# Patient Record
Sex: MALE | Race: BLACK OR AFRICAN AMERICAN | NOT HISPANIC OR LATINO | URBAN - METROPOLITAN AREA
[De-identification: names, ages, dates, MRNs, and addresses within clinical notes are randomized per-mention and may not be internally consistent; named-entity substitution may affect disease eponyms.]

---

## 2019-07-25 ENCOUNTER — INPATIENT (INPATIENT)
Facility: HOSPITAL | Age: 84
LOS: 5 days | Discharge: HOME | End: 2019-07-31
Attending: INTERNAL MEDICINE | Admitting: INTERNAL MEDICINE
Payer: MEDICARE

## 2019-07-25 VITALS
TEMPERATURE: 98 F | HEART RATE: 81 BPM | OXYGEN SATURATION: 99 % | DIASTOLIC BLOOD PRESSURE: 85 MMHG | SYSTOLIC BLOOD PRESSURE: 145 MMHG | RESPIRATION RATE: 16 BRPM

## 2019-07-25 DIAGNOSIS — Z91.89 OTHER SPECIFIED PERSONAL RISK FACTORS, NOT ELSEWHERE CLASSIFIED: ICD-10-CM

## 2019-07-25 DIAGNOSIS — Z29.9 ENCOUNTER FOR PROPHYLACTIC MEASURES, UNSPECIFIED: ICD-10-CM

## 2019-07-25 DIAGNOSIS — D64.9 ANEMIA, UNSPECIFIED: ICD-10-CM

## 2019-07-25 DIAGNOSIS — E83.52 HYPERCALCEMIA: ICD-10-CM

## 2019-07-25 DIAGNOSIS — I48.91 UNSPECIFIED ATRIAL FIBRILLATION: ICD-10-CM

## 2019-07-25 DIAGNOSIS — N40.0 BENIGN PROSTATIC HYPERPLASIA WITHOUT LOWER URINARY TRACT SYMPTOMS: ICD-10-CM

## 2019-07-25 DIAGNOSIS — I25.10 ATHEROSCLEROTIC HEART DISEASE OF NATIVE CORONARY ARTERY WITHOUT ANGINA PECTORIS: ICD-10-CM

## 2019-07-25 DIAGNOSIS — N18.3 CHRONIC KIDNEY DISEASE, STAGE 3 (MODERATE): ICD-10-CM

## 2019-07-25 DIAGNOSIS — T18.3XXA FOREIGN BODY IN SMALL INTESTINE, INITIAL ENCOUNTER: ICD-10-CM

## 2019-07-25 DIAGNOSIS — G30.9 ALZHEIMER'S DISEASE, UNSPECIFIED: ICD-10-CM

## 2019-07-25 DIAGNOSIS — F03.90 UNSPECIFIED DEMENTIA WITHOUT BEHAVIORAL DISTURBANCE: ICD-10-CM

## 2019-07-25 LAB
ALBUMIN SERPL ELPH-MCNC: 4.5 G/DL — SIGNIFICANT CHANGE UP (ref 3.3–5)
ALP SERPL-CCNC: 63 U/L — SIGNIFICANT CHANGE UP (ref 40–120)
ALT FLD-CCNC: 11 U/L — SIGNIFICANT CHANGE UP (ref 4–41)
ANION GAP SERPL CALC-SCNC: 13 MMO/L — SIGNIFICANT CHANGE UP (ref 7–14)
APPEARANCE UR: CLEAR — SIGNIFICANT CHANGE UP
AST SERPL-CCNC: 20 U/L — SIGNIFICANT CHANGE UP (ref 4–40)
BACTERIA # UR AUTO: NEGATIVE — SIGNIFICANT CHANGE UP
BASOPHILS # BLD AUTO: 0.05 K/UL — SIGNIFICANT CHANGE UP (ref 0–0.2)
BASOPHILS NFR BLD AUTO: 0.8 % — SIGNIFICANT CHANGE UP (ref 0–2)
BILIRUB SERPL-MCNC: 0.9 MG/DL — SIGNIFICANT CHANGE UP (ref 0.2–1.2)
BILIRUB UR-MCNC: NEGATIVE — SIGNIFICANT CHANGE UP
BLOOD UR QL VISUAL: NEGATIVE — SIGNIFICANT CHANGE UP
BUN SERPL-MCNC: 19 MG/DL — SIGNIFICANT CHANGE UP (ref 7–23)
CALCIUM SERPL-MCNC: 10.8 MG/DL — HIGH (ref 8.4–10.5)
CHLORIDE SERPL-SCNC: 104 MMOL/L — SIGNIFICANT CHANGE UP (ref 98–107)
CO2 SERPL-SCNC: 26 MMOL/L — SIGNIFICANT CHANGE UP (ref 22–31)
COLOR SPEC: YELLOW — SIGNIFICANT CHANGE UP
CREAT SERPL-MCNC: 1.13 MG/DL — SIGNIFICANT CHANGE UP (ref 0.5–1.3)
EOSINOPHIL # BLD AUTO: 0.07 K/UL — SIGNIFICANT CHANGE UP (ref 0–0.5)
EOSINOPHIL NFR BLD AUTO: 1.1 % — SIGNIFICANT CHANGE UP (ref 0–6)
GLUCOSE SERPL-MCNC: 109 MG/DL — HIGH (ref 70–99)
GLUCOSE UR-MCNC: NEGATIVE — SIGNIFICANT CHANGE UP
HCT VFR BLD CALC: 38 % — LOW (ref 39–50)
HGB BLD-MCNC: 11.4 G/DL — LOW (ref 13–17)
HYALINE CASTS # UR AUTO: NEGATIVE — SIGNIFICANT CHANGE UP
IMM GRANULOCYTES NFR BLD AUTO: 0.3 % — SIGNIFICANT CHANGE UP (ref 0–1.5)
KETONES UR-MCNC: NEGATIVE — SIGNIFICANT CHANGE UP
LEUKOCYTE ESTERASE UR-ACNC: NEGATIVE — SIGNIFICANT CHANGE UP
LYMPHOCYTES # BLD AUTO: 0.95 K/UL — LOW (ref 1–3.3)
LYMPHOCYTES # BLD AUTO: 15.6 % — SIGNIFICANT CHANGE UP (ref 13–44)
MCHC RBC-ENTMCNC: 27.9 PG — SIGNIFICANT CHANGE UP (ref 27–34)
MCHC RBC-ENTMCNC: 30 % — LOW (ref 32–36)
MCV RBC AUTO: 93.1 FL — SIGNIFICANT CHANGE UP (ref 80–100)
MONOCYTES # BLD AUTO: 0.49 K/UL — SIGNIFICANT CHANGE UP (ref 0–0.9)
MONOCYTES NFR BLD AUTO: 8 % — SIGNIFICANT CHANGE UP (ref 2–14)
NEUTROPHILS # BLD AUTO: 4.52 K/UL — SIGNIFICANT CHANGE UP (ref 1.8–7.4)
NEUTROPHILS NFR BLD AUTO: 74.2 % — SIGNIFICANT CHANGE UP (ref 43–77)
NITRITE UR-MCNC: NEGATIVE — SIGNIFICANT CHANGE UP
NRBC # FLD: 0 K/UL — SIGNIFICANT CHANGE UP (ref 0–0)
PH UR: 6 — SIGNIFICANT CHANGE UP (ref 5–8)
PLATELET # BLD AUTO: 189 K/UL — SIGNIFICANT CHANGE UP (ref 150–400)
PMV BLD: 10.5 FL — SIGNIFICANT CHANGE UP (ref 7–13)
POTASSIUM SERPL-MCNC: 3.6 MMOL/L — SIGNIFICANT CHANGE UP (ref 3.5–5.3)
POTASSIUM SERPL-SCNC: 3.6 MMOL/L — SIGNIFICANT CHANGE UP (ref 3.5–5.3)
PROT SERPL-MCNC: 7.8 G/DL — SIGNIFICANT CHANGE UP (ref 6–8.3)
PROT UR-MCNC: 30 — SIGNIFICANT CHANGE UP
RBC # BLD: 4.08 M/UL — LOW (ref 4.2–5.8)
RBC # FLD: 13.9 % — SIGNIFICANT CHANGE UP (ref 10.3–14.5)
RBC CASTS # UR COMP ASSIST: SIGNIFICANT CHANGE UP (ref 0–?)
SODIUM SERPL-SCNC: 143 MMOL/L — SIGNIFICANT CHANGE UP (ref 135–145)
SP GR SPEC: 1.03 — SIGNIFICANT CHANGE UP (ref 1–1.04)
SQUAMOUS # UR AUTO: SIGNIFICANT CHANGE UP
UROBILINOGEN FLD QL: HIGH
WBC # BLD: 6.1 K/UL — SIGNIFICANT CHANGE UP (ref 3.8–10.5)
WBC # FLD AUTO: 6.1 K/UL — SIGNIFICANT CHANGE UP (ref 3.8–10.5)
WBC UR QL: SIGNIFICANT CHANGE UP (ref 0–?)

## 2019-07-25 PROCEDURE — 99223 1ST HOSP IP/OBS HIGH 75: CPT

## 2019-07-25 PROCEDURE — 71046 X-RAY EXAM CHEST 2 VIEWS: CPT | Mod: 26

## 2019-07-25 PROCEDURE — 93010 ELECTROCARDIOGRAM REPORT: CPT

## 2019-07-25 RX ORDER — SODIUM CHLORIDE 9 MG/ML
1000 INJECTION INTRAMUSCULAR; INTRAVENOUS; SUBCUTANEOUS
Refills: 0 | Status: DISCONTINUED | OUTPATIENT
Start: 2019-07-25 | End: 2019-07-31

## 2019-07-25 RX ORDER — DONEPEZIL HYDROCHLORIDE 10 MG/1
5 TABLET, FILM COATED ORAL AT BEDTIME
Refills: 0 | Status: DISCONTINUED | OUTPATIENT
Start: 2019-07-25 | End: 2019-07-31

## 2019-07-25 RX ORDER — HALOPERIDOL DECANOATE 100 MG/ML
0.5 INJECTION INTRAMUSCULAR EVERY 6 HOURS
Refills: 0 | Status: DISCONTINUED | OUTPATIENT
Start: 2019-07-25 | End: 2019-07-31

## 2019-07-25 RX ORDER — ATORVASTATIN CALCIUM 80 MG/1
10 TABLET, FILM COATED ORAL AT BEDTIME
Refills: 0 | Status: DISCONTINUED | OUTPATIENT
Start: 2019-07-25 | End: 2019-07-31

## 2019-07-25 RX ORDER — DILTIAZEM HCL 120 MG
120 CAPSULE, EXT RELEASE 24 HR ORAL DAILY
Refills: 0 | Status: DISCONTINUED | OUTPATIENT
Start: 2019-07-26 | End: 2019-07-31

## 2019-07-25 RX ORDER — THIAMINE MONONITRATE (VIT B1) 100 MG
100 TABLET ORAL DAILY
Refills: 0 | Status: DISCONTINUED | OUTPATIENT
Start: 2019-07-25 | End: 2019-07-31

## 2019-07-25 RX ORDER — TAMSULOSIN HYDROCHLORIDE 0.4 MG/1
0.4 CAPSULE ORAL AT BEDTIME
Refills: 0 | Status: DISCONTINUED | OUTPATIENT
Start: 2019-07-25 | End: 2019-07-31

## 2019-07-25 RX ORDER — CLOPIDOGREL BISULFATE 75 MG/1
75 TABLET, FILM COATED ORAL DAILY
Refills: 0 | Status: DISCONTINUED | OUTPATIENT
Start: 2019-07-26 | End: 2019-07-31

## 2019-07-25 RX ADMIN — SODIUM CHLORIDE 75 MILLILITER(S): 9 INJECTION INTRAMUSCULAR; INTRAVENOUS; SUBCUTANEOUS at 16:05

## 2019-07-25 NOTE — H&P ADULT - PROBLEM SELECTOR PLAN 1
-worsening dementia with behavioral disturbance, no identifiable infectious or metabolic cause  -QTc 450, treat agitation with IV haldol .5 mg q6 prn, behavioral health c/s in am   -SW and CM working with daughter to find placement -worsening dementia with behavioral disturbance, no identifiable infectious or metabolic cause  -QTc 450, treat agitation with IV haldol .5 mg q6 prn, behavioral health c/s in am   -SW and CM working with daughter to find placement  -c/w donepezil

## 2019-07-25 NOTE — DISCHARGE NOTE PROVIDER - CARE PROVIDERS DIRECT ADDRESSES
,[email or web address]
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
## 2019-07-25 NOTE — H&P ADULT - PROBLEM SELECTOR PLAN 3
- rate controlled  - pending med rec and restart home medication - rate controlled  - c/w cardizem  -given age, advanced dementia, and potential fall risk (uses cane to ambulate), risks of starting a/c for afib likely outweigh benefits

## 2019-07-25 NOTE — ED PROVIDER NOTE - PHYSICAL EXAMINATION
General - alert, no distress, smells of urine  HEENT - normocephalic, atraumatic, moist mucous membranes  CV - S1, S2, no murmur  Pulm - clear to ascultation, no wheeze, rales, ronchi  Abdomen - soft, nontender, nondistended  Extremities - no edema, nontender  Neuro - alert, oriented x1  Psych - word-finding difficulty, affect appropriate
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
## 2019-07-25 NOTE — DISCHARGE NOTE PROVIDER - NSDCCPCAREPLAN_GEN_ALL_CORE_FT
PRINCIPAL DISCHARGE DIAGNOSIS  Diagnosis: Alzheimer's dementia with behavioral disturbance, unspecified timing of dementia onset  Assessment and Plan of Treatment: You were seen by psychiatry who recommeded---      SECONDARY DISCHARGE DIAGNOSES  Diagnosis: Foreign body in intestine, initial encounter  Assessment and Plan of Treatment: ---    Diagnosis: Anemia, unspecified type  Assessment and Plan of Treatment: Stable. Continue to monitor outpatinet with PCP.    Diagnosis: Atrial fibrillation, unspecified type  Assessment and Plan of Treatment: Continue home medications.    Diagnosis: Hypercalcemia  Assessment and Plan of Treatment: Continue to monitor outpatinet with PCP.    Diagnosis: CKD (chronic kidney disease) stage 3, GFR 30-59 ml/min  Assessment and Plan of Treatment: Continue care with PCP outpatinet. PRINCIPAL DISCHARGE DIAGNOSIS  Diagnosis: Alzheimer's dementia with behavioral disturbance, unspecified timing of dementia onset  Assessment and Plan of Treatment: You were seen by psychiatry who recommeded---      SECONDARY DISCHARGE DIAGNOSES  Diagnosis: CKD (chronic kidney disease) stage 3, GFR 30-59 ml/min  Assessment and Plan of Treatment: Continue care with PCP outpatinet.    Diagnosis: Anemia, unspecified type  Assessment and Plan of Treatment: Stable. Continue to monitor outpatinet with PCP.    Diagnosis: Hypercalcemia  Assessment and Plan of Treatment: Continue to monitor outpatinet with PCP.    Diagnosis: Foreign body in intestine, initial encounter  Assessment and Plan of Treatment: CT of the abdomen _______________    Diagnosis: Atrial fibrillation, unspecified type  Assessment and Plan of Treatment: Continue home medications. PRINCIPAL DISCHARGE DIAGNOSIS  Diagnosis: Alzheimer's dementia with behavioral disturbance, unspecified timing of dementia onset  Assessment and Plan of Treatment: Please continue your medications as prescribed and allow help with daily acitivities of living. Maintain a safe environment and make movements in a careful manner to prevent falls. Ensure that you are eating and drinking adequately and maintaining a healthy sleep cycle. If you are in need of assistance with medication adjustment you can follow-up with your outpatient provider or refer to the [facility] Geriatric Psychiatry clinic by calling [phone].      SECONDARY DISCHARGE DIAGNOSES  Diagnosis: Inguinal hernia  Assessment and Plan of Treatment: You were found to have an inguinal hernia upon CT scan of the abdomen for which surgery was consulted and recommends outpatient follow-up for elective surgery.    Diagnosis: CKD (chronic kidney disease) stage 3, GFR 30-59 ml/min  Assessment and Plan of Treatment: In order to prevent further disease progression, continue to follow recommendations made by your primary provider/nephrologist. Continue a diet that is low in sodium and avoid foods that are concentrated in potassium and phosphorus. Continue your medications/supplementations as directed and avoid over-the-counter drugs that are harmful to kidneys, such as, Non-Steroidal Anti-Inflammatory Drugs (NSAIDs). Follow-up as outpatient to monitor your kidney function, as well as, vitamin D, Calcium, potassium, and phosphorus levels.    Diagnosis: Anemia, unspecified type  Assessment and Plan of Treatment: Follow-up with your outpatient provider for further monitoring of your blood counts. Monitor for signs/symptoms indicating worsening of disease, such as, easy bleeding/bruising, pale skin, fatigue, dizziness, increased heart rate, or chest pain.    Diagnosis: Benign prostatic hyperplasia, unspecified whether lower urinary tract symptoms present  Assessment and Plan of Treatment: Continue Flomax.    Diagnosis: Coronary artery disease, angina presence unspecified, unspecified vessel or lesion type, unspecified whether native or transplanted heart  Assessment and Plan of Treatment: PLAVIX ___________    Diagnosis: Atrial fibrillation, unspecified type  Assessment and Plan of Treatment: Please continue your Cardizem as directed and follow-up with your primary provider/cardiologist to further manage your care. Monitor for signs/symptoms of uncontrolled atrial fibrillation, such as, increased heart rate, palpitations, chest pain, dizziness, or shortness of breath - Return to emergency room if these signs/symptoms are present. PRINCIPAL DISCHARGE DIAGNOSIS  Diagnosis: Alzheimer's dementia with behavioral disturbance, unspecified timing of dementia onset  Assessment and Plan of Treatment: Please continue your medications as prescribed and allow help with daily acitivities of living. Maintain a safe environment and make movements in a careful manner to prevent falls. Ensure that you are eating and drinking adequately and maintaining a healthy sleep cycle. If you are in need of assistance with medication adjustment you can follow-up with your outpatient provider or refer to the [facility] Geriatric Psychiatry clinic by calling [phone].      SECONDARY DISCHARGE DIAGNOSES  Diagnosis: Inguinal hernia  Assessment and Plan of Treatment: You were found to have an inguinal hernia upon CT scan of the abdomen for which surgery was consulted and recommends outpatient follow-up for elective surgery.    Diagnosis: CKD (chronic kidney disease) stage 3, GFR 30-59 ml/min  Assessment and Plan of Treatment: In order to prevent further disease progression, continue to follow recommendations made by your primary provider/nephrologist. Continue a diet that is low in sodium and avoid foods that are concentrated in potassium and phosphorus. Continue your medications/supplementations as directed and avoid over-the-counter drugs that are harmful to kidneys, such as, Non-Steroidal Anti-Inflammatory Drugs (NSAIDs). Follow-up as outpatient to monitor your kidney function, as well as, vitamin D, Calcium, potassium, and phosphorus levels.    Diagnosis: Anemia, unspecified type  Assessment and Plan of Treatment: Follow-up with your outpatient provider for further monitoring of your blood counts. Monitor for signs/symptoms indicating worsening of disease, such as, easy bleeding/bruising, pale skin, fatigue, dizziness, increased heart rate, or chest pain.    Diagnosis: Benign prostatic hyperplasia, unspecified whether lower urinary tract symptoms present  Assessment and Plan of Treatment: Continue Flomax.    Diagnosis: Coronary artery disease, angina presence unspecified, unspecified vessel or lesion type, unspecified whether native or transplanted heart  Assessment and Plan of Treatment: Continue Aspirin and Plavix and follow-up with cardiologist as outpatient.    Diagnosis: Atrial fibrillation, unspecified type  Assessment and Plan of Treatment: Please continue your Cardizem as directed and follow-up with your primary provider/cardiologist to further manage your care. Monitor for signs/symptoms of uncontrolled atrial fibrillation, such as, increased heart rate, palpitations, chest pain, dizziness, or shortness of breath - Return to emergency room if these signs/symptoms are present. PRINCIPAL DISCHARGE DIAGNOSIS  Diagnosis: Alzheimer's dementia with behavioral disturbance, unspecified timing of dementia onset  Assessment and Plan of Treatment: Please continue your medications as prescribed and allow help with daily acitivities of living. Maintain a safe environment and make movements in a careful manner to prevent falls. Ensure that you are eating and drinking adequately and maintaining a healthy sleep cycle. If you are in need of assistance with medication adjustment you can follow-up with your outpatient provider or refer to the [facility] Geriatric Psychiatry clinic by calling [phone].      SECONDARY DISCHARGE DIAGNOSES  Diagnosis: Anemia, unspecified type  Assessment and Plan of Treatment: Follow-up with your outpatient provider for further monitoring of your blood counts. Monitor for signs/symptoms indicating worsening of disease, such as, easy bleeding/bruising, pale skin, fatigue, dizziness, increased heart rate, or chest pain.    Diagnosis: Atrial fibrillation, unspecified type  Assessment and Plan of Treatment: Please continue your Cardizem as directed and follow-up with your primary provider/cardiologist to further manage your care. Monitor for signs/symptoms of uncontrolled atrial fibrillation, such as, increased heart rate, palpitations, chest pain, dizziness, or shortness of breath - Return to emergency room if these signs/symptoms are present.    Diagnosis: CKD (chronic kidney disease) stage 3, GFR 30-59 ml/min  Assessment and Plan of Treatment: In order to prevent further disease progression, continue to follow recommendations made by your primary provider/nephrologist. Continue a diet that is low in sodium and avoid foods that are concentrated in potassium and phosphorus. Continue your medications/supplementations as directed and avoid over-the-counter drugs that are harmful to kidneys, such as, Non-Steroidal Anti-Inflammatory Drugs (NSAIDs). Follow-up as outpatient to monitor your kidney function, as well as, vitamin D, Calcium, potassium, and phosphorus levels.    Diagnosis: Inguinal hernia  Assessment and Plan of Treatment: You were found to have an inguinal hernia upon CT scan of the abdomen for which surgery was consulted and recommends outpatient follow-up for elective surgery.    Diagnosis: Coronary artery disease, angina presence unspecified, unspecified vessel or lesion type, unspecified whether native or transplanted heart  Assessment and Plan of Treatment: Continue Aspirin and Plavix and follow-up with cardiologist as outpatient.    Diagnosis: Benign prostatic hyperplasia, unspecified whether lower urinary tract symptoms present  Assessment and Plan of Treatment: Continue Flomax.

## 2019-07-25 NOTE — ED ADULT NURSE NOTE - NSIMPLEMENTINTERV_GEN_ALL_ED
Implemented All Universal Safety Interventions:  [place] to call system. Call bell, personal items and telephone within reach. Instruct patient to call for assistance. Room bathroom lighting operational. Non-slip footwear when patient is off stretcher. Physically safe environment: no spills, clutter or unnecessary equipment. Stretcher in lowest position, wheels locked, appropriate side rails in place.

## 2019-07-25 NOTE — H&P ADULT - PROBLEM SELECTOR PROBLEM 5
Anemia, unspecified type Coronary artery disease, angina presence unspecified, unspecified vessel or lesion type, unspecified whether native or transplanted heart
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
## 2019-07-25 NOTE — H&P ADULT - NSHPPHYSICALEXAM_GEN_ALL_CORE
Vital Signs Last 24 Hrs  T(C): 36.6 (07-25-19 @ 15:56), Max: 36.7 (07-25-19 @ 09:14)  T(F): 97.8 (07-25-19 @ 15:56), Max: 98.1 (07-25-19 @ 09:14)  HR: 92 (07-25-19 @ 15:56) (81 - 92)  BP: 154/90 (07-25-19 @ 15:56) (145/85 - 154/90)  BP(mean): --  RR: 16 (07-25-19 @ 15:56) (16 - 17)  SpO2: 99% (07-25-19 @ 15:56) (99% - 99%)    GENERAL: NAD  HEENT: EOMI, MMM, no oropharyngeal lesions or erythema appreciated  Pulm: normal work of breathing, CTABL  CV: irregularly irregular, S1&S2+, no m/r/g appreciated  ABDOMEN: soft, nt, nd,   MSK: nl ROM  EXTREMITIES:  no appreciable edema in b/l LE  Neuro: A&Ox1 (knows name), no focal deficits  SKIN: warm and dry, no visible rash

## 2019-07-25 NOTE — ED PROVIDER NOTE - NS ED ATTENDING STATEMENT MOD
I have personally seen and examined this patient.  I have fully participated in the care of this patient. I have reviewed all pertinent clinical information, including history, physical exam, plan and the Medical Student and Resident’s note and agree except as noted.

## 2019-07-25 NOTE — DISCHARGE NOTE PROVIDER - PROVIDER TOKENS
PROVIDER:[TOKEN:[[id number]]] PROVIDER:[TOKEN:[[id number]]],PROVIDER:[TOKEN:[[id number]]]

## 2019-07-25 NOTE — H&P ADULT - ASSESSMENT
87M h/o dementia, HTN, afib, brought in by EMS after leaving home, and daughter reports she can no longer take care of him at home, a/f behavioral disturbance and assistance with placement: 87M h/o dementia, HTN, afib, BPH, brought in by EMS after leaving home, and daughter reports she can no longer take care of him at home, a/f behavioral disturbance and assistance with placement:

## 2019-07-25 NOTE — H&P ADULT - PROBLEM SELECTOR PLAN 10
patient has Ramsey Carrasquillo listed as PMD but no notes in all scripts. clarify PMD in am with daughter.
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
## 2019-07-25 NOTE — DISCHARGE NOTE PROVIDER - HOSPITAL COURSE
87M h/o dementia, HTN, afib, brought in by EMS after leaving home, and daughter reports she can no longer take care of him at home, a/f behavioral disturbance and assistance with placement: 87M h/o dementia, HTN, afib, brought in by EMS after leaving home, and daughter reports she can no longer take care of him at home, a/f behavioral disturbance and assistance with placement        Alzheimer's dementia     - worsening dementia with behavioral disturbance, no identifiable infectious or metabolic cause    - Psych consulted     - SW and CM working with daughter to find placement.         Foreign body in intestine, initial encounter.      Abd xray: Small linear metallic density in the right upper quadrant. This may represent surgical clips versus ingested foreign body    Ct of abdomen ordered, f/u __________________        Atrial fibrillation, unspecified type.      - rate controlled        Hypercalcemia.       - s/p IVF, improved         Anemia    - stable from 2016, continue to monitor.         CKD    - renally dose meds, outpt f/u.        Dispo ________________ 87M h/o dementia, HTN, afib, brought in by EMS after leaving home, and daughter reports she can no longer take care of him at home, a/f behavioral disturbance and assistance with placement        Alzheimer's dementia: Patient with worsening dementia with behavioral disturbance, no identifiable infectious or metabolic cause; Psychiatry consulted and PRNs ordered; SW/CM consulted to work with patient's daughter for placement versus taking patient home with help        Foreign body: CT abdomen confirms that X-ray imaging in fact shows cholecystectomy surgical clips rather than a foreign body; Also noted to find L inguinal hernia for which surgery was consulted and recommends outpatient follow-up as there is no obstruction/strangulation        Atrial fibrillation: Cardiology consulted; Rate controlled with continued Cardizem        CAD: Continued with statin; Cardiology consulted and recommended in setting of fall risk that AC be discontinued; PLAVIX _____________        Hypercalcemia: Given IVF and levels returned to normal         Dispo: 87M h/o dementia, HTN, afib, brought in by EMS after leaving home, and daughter reports she can no longer take care of him at home, a/f behavioral disturbance and assistance with placement        Alzheimer's dementia: Patient with worsening dementia with behavioral disturbance, no identifiable infectious or metabolic cause; Psychiatry consulted and PRNs ordered; SW/CM consulted to work with patient's daughter for placement versus taking patient home with help        Foreign body: CT abdomen confirms that X-ray imaging in fact shows cholecystectomy surgical clips rather than a foreign body; Also noted to find L inguinal hernia for which surgery was consulted and recommends outpatient follow-up as there is no obstruction/strangulation        Atrial fibrillation: Cardiology consulted; Rate controlled with continued Cardizem; No AC recommended in setting of fall risk         CAD: Continued with statin and Plavix; Cardiology consulted        Hypercalcemia: Given IVF and levels returned to normal         Dispo: 87M h/o dementia, HTN, afib, brought in by EMS after leaving home, and daughter reports she can no longer take care of him at home, a/f behavioral disturbance and assistance with placement        Alzheimer's dementia: Patient with worsening dementia with behavioral disturbance, no identifiable infectious or metabolic cause; Psychiatry consulted and PRNs ordered; SW/CM consulted to work with patient's daughter for placement versus taking patient home with help        Foreign body: CT abdomen confirms that X-ray imaging in fact shows cholecystectomy surgical clips rather than a foreign body; Also noted to find L inguinal hernia for which surgery was consulted and recommends outpatient follow-up as there is no obstruction/strangulation        Atrial fibrillation: Cardiology consulted; Rate controlled with continued Cardizem; No AC recommended in setting of fall risk         CAD: Continued with statin and Plavix; Cardiology consulted        Hypercalcemia: Given IVF and levels returned to normal         Dispo:  Home 87M h/o dementia, HTN, afib, brought in by EMS after leaving home, and daughter reports she can no longer take care of him at home, a/f behavioral disturbance and assistance with placement        Alzheimer's dementia: Patient with worsening dementia with behavioral disturbance, no identifiable infectious or metabolic cause; Psychiatry consulted and PRNs ordered; SW/CM consulted to work with patient's daughter for placement versus taking patient home with help        Foreign body: CT abdomen confirms that X-ray imaging in fact shows cholecystectomy surgical clips rather than a foreign body; Also noted to find L inguinal hernia for which surgery was consulted and recommends outpatient follow-up as there is no obstruction/strangulation        Atrial fibrillation: Cardiology consulted; Rate controlled with continued Cardizem; No AC recommended in setting of fall risk         CAD: Continued with statin and Plavix; Cardiology consulted        Hypercalcemia: Given IVF and levels returned to normal         Dispo:  Home     As per daughter Melba [facility] and does not need refills at this time. No new medications started at this time.

## 2019-07-25 NOTE — H&P ADULT - NSICDXPASTMEDICALHX_GEN_ALL_CORE_FT
PAST MEDICAL HISTORY:  Atrial fibrillation     Hypertension, unspecified type     Other hyperlipidemia

## 2019-07-25 NOTE — ED ADULT TRIAGE NOTE - CHIEF COMPLAINT QUOTE
Pt BIBA because daughter can no longer care for him and wants him placed in SNF. Pt offers no complaints PMH dementia, HLD

## 2019-07-25 NOTE — DISCHARGE NOTE PROVIDER - CARE PROVIDER_API CALL
Ramsey Carrasquillo)  Internal Medicine  [address] [place] [address]  Phone: [phone]  Fax: [phone]  Follow Up Time: Ramsey Carrasquillo)  Internal Medicine  [id number] [place] [address]  Phone: [phone]  Fax: [phone]  Follow Up Time:     Tommy Jennings)  Surgery  [address], [address]  [place] [address]  Phone: [phone]  Fax: [phone]  Follow Up Time:

## 2019-07-25 NOTE — ED PROVIDER NOTE - CLINICAL SUMMARY MEDICAL DECISION MAKING FREE TEXT BOX
86 yo M w/ dementia HTN, HLD here for SNF placement  Social work following  unable to contact daughter  basic labs, UA/urine culture  will admit to hospitalist for placement

## 2019-07-26 DIAGNOSIS — F03.91 UNSPECIFIED DEMENTIA WITH BEHAVIORAL DISTURBANCE: ICD-10-CM

## 2019-07-26 LAB
ANION GAP SERPL CALC-SCNC: 11 MMO/L — SIGNIFICANT CHANGE UP (ref 7–14)
BUN SERPL-MCNC: 16 MG/DL — SIGNIFICANT CHANGE UP (ref 7–23)
CALCIUM SERPL-MCNC: 10 MG/DL — SIGNIFICANT CHANGE UP (ref 8.4–10.5)
CHLORIDE SERPL-SCNC: 107 MMOL/L — SIGNIFICANT CHANGE UP (ref 98–107)
CO2 SERPL-SCNC: 25 MMOL/L — SIGNIFICANT CHANGE UP (ref 22–31)
CREAT SERPL-MCNC: 1.06 MG/DL — SIGNIFICANT CHANGE UP (ref 0.5–1.3)
GLUCOSE SERPL-MCNC: 79 MG/DL — SIGNIFICANT CHANGE UP (ref 70–99)
MAGNESIUM SERPL-MCNC: 1.6 MG/DL — SIGNIFICANT CHANGE UP (ref 1.6–2.6)
PHOSPHATE SERPL-MCNC: 2.9 MG/DL — SIGNIFICANT CHANGE UP (ref 2.5–4.5)
POTASSIUM SERPL-MCNC: 3.7 MMOL/L — SIGNIFICANT CHANGE UP (ref 3.5–5.3)
POTASSIUM SERPL-SCNC: 3.7 MMOL/L — SIGNIFICANT CHANGE UP (ref 3.5–5.3)
SODIUM SERPL-SCNC: 143 MMOL/L — SIGNIFICANT CHANGE UP (ref 135–145)

## 2019-07-26 PROCEDURE — 99233 SBSQ HOSP IP/OBS HIGH 50: CPT

## 2019-07-26 PROCEDURE — 74018 RADEX ABDOMEN 1 VIEW: CPT | Mod: 26

## 2019-07-26 RX ADMIN — TAMSULOSIN HYDROCHLORIDE 0.4 MILLIGRAM(S): 0.4 CAPSULE ORAL at 21:09

## 2019-07-26 RX ADMIN — ATORVASTATIN CALCIUM 10 MILLIGRAM(S): 80 TABLET, FILM COATED ORAL at 21:09

## 2019-07-26 RX ADMIN — Medication 1 TABLET(S): at 12:30

## 2019-07-26 RX ADMIN — Medication 100 MILLIGRAM(S): at 12:30

## 2019-07-26 RX ADMIN — Medication 120 MILLIGRAM(S): at 05:21

## 2019-07-26 RX ADMIN — CLOPIDOGREL BISULFATE 75 MILLIGRAM(S): 75 TABLET, FILM COATED ORAL at 12:30

## 2019-07-26 RX ADMIN — DONEPEZIL HYDROCHLORIDE 5 MILLIGRAM(S): 10 TABLET, FILM COATED ORAL at 21:09
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
## 2019-07-26 NOTE — BEHAVIORAL HEALTH ASSESSMENT NOTE - NSBHCONSULTMEDAGITATION_PSY_A_CORE FT
Seroquel 12.5mg PO/IV q6h PRN agitation.  Monitor for qtc<500 Seroquel 12.5mg PO q6h PRN agitation.  Monitor for qtc<500

## 2019-07-26 NOTE — BEHAVIORAL HEALTH ASSESSMENT NOTE - RISK ASSESSMENT
Pt is currently at low baseline risk. Acute risk factors include cognitive impairment and impulsivity. Protective factors include no current or past SI, no h/o psychiatric disorder, no history of psychiatric hospitalizations, no family history of SAs, supportive family.
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
## 2019-07-26 NOTE — BEHAVIORAL HEALTH ASSESSMENT NOTE - PRIMARY DX
Alzheimer's dementia with behavioral disturbance, unspecified timing of dementia onset Dementia with behavioral disturbance, unspecified dementia type
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
## 2019-07-26 NOTE — PROGRESS NOTE ADULT - SUBJECTIVE AND OBJECTIVE BOX
INTERVAL HPI/OVERNIGHT EVENTS: I have upset stomach   Vital Signs Last 24 Hrs  T(C): 36.6 (2019 05:18), Max: 36.6 (2019 15:56)  T(F): 97.9 (2019 05:18), Max: 97.9 (2019 05:18)  HR: 90 (2019 05:18) (90 - 93)  BP: 158/85 (2019 05:18) (142/93 - 158/85)  BP(mean): --  RR: 18 (2019 05:18) (16 - 18)  SpO2: 99% (2019 05:18) (99% - 99%)  I&O's Summary    MEDICATIONS  (STANDING):  atorvastatin 10 milliGRAM(s) Oral at bedtime  clopidogrel Tablet 75 milliGRAM(s) Oral daily  diltiazem    milliGRAM(s) Oral daily  donepezil 5 milliGRAM(s) Oral at bedtime  multivitamin 1 Tablet(s) Oral daily  sodium chloride 0.9%. 1000 milliLiter(s) (75 mL/Hr) IV Continuous <Continuous>  tamsulosin 0.4 milliGRAM(s) Oral at bedtime  thiamine 100 milliGRAM(s) Oral daily    MEDICATIONS  (PRN):  haloperidol    Injectable 0.5 milliGRAM(s) IV Push every 6 hours PRN agitation    LABS:                        11.4   6.10  )-----------( 189      ( 2019 11:00 )             38.0     07-26    143  |  107  |  16  ----------------------------<  79  3.7   |  25  |  1.06    Ca    10.0      2019 06:06  Phos  2.9     07-  Mg     1.6     07-26    TPro  7.8  /  Alb  4.5  /  TBili  0.9  /  DBili  x   /  AST  20  /  ALT  11  /  AlkPhos  63  07-25      Urinalysis Basic - ( 2019 11:00 )    Color: YELLOW / Appearance: CLEAR / S.030 / pH: 6.0  Gluc: NEGATIVE / Ketone: NEGATIVE  / Bili: NEGATIVE / Urobili: SMALL   Blood: NEGATIVE / Protein: 30 / Nitrite: NEGATIVE   Leuk Esterase: NEGATIVE / RBC: 2-5 / WBC 0-2   Sq Epi: OCC / Non Sq Epi: x / Bacteria: NEGATIVE      CAPILLARY BLOOD GLUCOSE            Urinalysis Basic - ( 2019 11:00 )    Color: YELLOW / Appearance: CLEAR / S.030 / pH: 6.0  Gluc: NEGATIVE / Ketone: NEGATIVE  / Bili: NEGATIVE / Urobili: SMALL   Blood: NEGATIVE / Protein: 30 / Nitrite: NEGATIVE   Leuk Esterase: NEGATIVE / RBC: 2-5 / WBC 0-2   Sq Epi: OCC / Non Sq Epi: x / Bacteria: NEGATIVE      REVIEW OF SYSTEMS:  CONSTITUTIONAL: No fever, weight loss, or fatigue  EYES: No eye pain, visual disturbances, or discharge  ENMT:  No difficulty hearing, tinnitus, vertigo; No sinus or throat pain  NECK: No pain or stiffness  RESPIRATORY: No cough, wheezing, chills or hemoptysis; No shortness of breath  CARDIOVASCULAR: No chest pain, palpitations, dizziness, or leg swelling  GASTROINTESTINAL: upset stomach   GENITOURINARY: No dysuria, frequency, hematuria, or incontinence  NEUROLOGICAL: No headaches, memory loss, loss of strength, numbness, or tremors  Consultant(s) Notes Reviewed:  [x ] YES  [ ] NO    PHYSICAL EXAM:  GENERAL: NAD, well-groomed, well-developed ,not in any distress ,  HEAD:  Atraumatic, Normocephalic  EYES: EOMI, PERRLA, conjunctiva and sclera clear  ENMT: No tonsillar erythema, exudates, or enlargement; Moist mucous membranes, Good dentition, No lesions  NECK: Supple, No JVD, Normal thyroid  NERVOUS SYSTEM:  Alert & Oriented X2 , No focal deficit   CHEST/LUNG: Good air entry bilateral with no  rales, rhonchi, wheezing, or rubs  HEART: Regular rate and rhythm; No murmurs, rubs, or gallops  ABDOMEN: Soft, Nontender, Nondistended; Bowel sounds present  EXTREMITIES:  2+ Peripheral Pulses, No clubbing, cyanosis, or edema    Care Discussed with Consultants/Other Providers [ x] YES  [ ] NO

## 2019-07-26 NOTE — BEHAVIORAL HEALTH ASSESSMENT NOTE - NSBHCONSULTOBSREASON_PSY_A_CORE FT
Pt currently calm and in good behavioral control, no SI/HI; team may consider enhanced if he becomes elopement risk or increasingly agitated.

## 2019-07-26 NOTE — PHYSICAL THERAPY INITIAL EVALUATION ADULT - ADDITIONAL COMMENTS
Patient report lives with daughter in apartment, elevator access, ambulates with out assistive device.

## 2019-07-26 NOTE — PHYSICAL THERAPY INITIAL EVALUATION ADULT - PERTINENT HX OF CURRENT PROBLEM, REHAB EVAL
Patient is 87 year old male admitted with history of HTN, HLD, BPH, presents for worsening behavioral disturbances.

## 2019-07-26 NOTE — BEHAVIORAL HEALTH ASSESSMENT NOTE - NSBHCHARTREVIEWVS_PSY_A_CORE FT
Vital Signs Last 24 Hrs  T(C): 36.6 (26 Jul 2019 05:18), Max: 36.6 (25 Jul 2019 15:56)  T(F): 97.9 (26 Jul 2019 05:18), Max: 97.9 (26 Jul 2019 05:18)  HR: 90 (26 Jul 2019 05:18) (84 - 93)  BP: 158/85 (26 Jul 2019 05:18) (142/93 - 158/85)  BP(mean): --  RR: 18 (26 Jul 2019 05:18) (16 - 18)  SpO2: 99% (26 Jul 2019 05:18) (99% - 99%)

## 2019-07-26 NOTE — BEHAVIORAL HEALTH ASSESSMENT NOTE - NSBHCHARTREVIEWLAB_PSY_A_CORE FT
11.4   6.10  )-----------( 189      ( 2019 11:00 )             38.0     07-26    143  |  107  |  16  ----------------------------<  79  3.7   |  25  |  1.06    Ca    10.0      2019 06:06  Phos  2.9     07-  Mg     1.6     -    TPro  7.8  /  Alb  4.5  /  TBili  0.9  /  DBili  x   /  AST  20  /  ALT  11  /  AlkPhos  63  07-25    Urinalysis Basic - ( 2019 11:00 )    Color: YELLOW / Appearance: CLEAR / S.030 / pH: 6.0  Gluc: NEGATIVE / Ketone: NEGATIVE  / Bili: NEGATIVE / Urobili: SMALL   Blood: NEGATIVE / Protein: 30 / Nitrite: NEGATIVE   Leuk Esterase: NEGATIVE / RBC: 2-5 / WBC 0-2   Sq Epi: OCC / Non Sq Epi: x / Bacteria: NEGATIVE

## 2019-07-27 RX ADMIN — Medication 100 MILLIGRAM(S): at 11:30

## 2019-07-27 RX ADMIN — CLOPIDOGREL BISULFATE 75 MILLIGRAM(S): 75 TABLET, FILM COATED ORAL at 11:30

## 2019-07-27 RX ADMIN — ATORVASTATIN CALCIUM 10 MILLIGRAM(S): 80 TABLET, FILM COATED ORAL at 21:20

## 2019-07-27 RX ADMIN — DONEPEZIL HYDROCHLORIDE 5 MILLIGRAM(S): 10 TABLET, FILM COATED ORAL at 21:20

## 2019-07-27 RX ADMIN — TAMSULOSIN HYDROCHLORIDE 0.4 MILLIGRAM(S): 0.4 CAPSULE ORAL at 21:20

## 2019-07-27 RX ADMIN — Medication 120 MILLIGRAM(S): at 05:15

## 2019-07-27 RX ADMIN — Medication 1 TABLET(S): at 11:30

## 2019-07-27 NOTE — PROGRESS NOTE ADULT - SUBJECTIVE AND OBJECTIVE BOX
INTERVAL HPI/OVERNIGHT EVENTS: I feel fine.   Vital Signs Last 24 Hrs  T(C): 36.6 (27 Jul 2019 13:05), Max: 36.6 (26 Jul 2019 20:34)  T(F): 97.8 (27 Jul 2019 13:05), Max: 97.9 (26 Jul 2019 20:34)  HR: 75 (27 Jul 2019 13:05) (75 - 75)  BP: 114/64 (27 Jul 2019 13:05) (114/64 - 134/71)  BP(mean): --  RR: 16 (27 Jul 2019 13:05) (16 - 18)  SpO2: 100% (27 Jul 2019 13:05) (100% - 100%)  I&O's Summary    MEDICATIONS  (STANDING):  atorvastatin 10 milliGRAM(s) Oral at bedtime  clopidogrel Tablet 75 milliGRAM(s) Oral daily  diltiazem    milliGRAM(s) Oral daily  donepezil 5 milliGRAM(s) Oral at bedtime  multivitamin 1 Tablet(s) Oral daily  sodium chloride 0.9%. 1000 milliLiter(s) (75 mL/Hr) IV Continuous <Continuous>  tamsulosin 0.4 milliGRAM(s) Oral at bedtime  thiamine 100 milliGRAM(s) Oral daily    MEDICATIONS  (PRN):  haloperidol    Injectable 0.5 milliGRAM(s) IV Push every 6 hours PRN agitation    LABS:    07-26    143  |  107  |  16  ----------------------------<  79  3.7   |  25  |  1.06    Ca    10.0      26 Jul 2019 06:06  Phos  2.9     07-26  Mg     1.6     07-26          CAPILLARY BLOOD GLUCOSE              REVIEW OF SYSTEMS:  CONSTITUTIONAL: No fever, weight loss, or fatigue  EYES: No eye pain, visual disturbances, or discharge  ENMT:  No difficulty hearing, tinnitus, vertigo; No sinus or throat pain  NECK: No pain or stiffness  RESPIRATORY: No cough, wheezing, chills or hemoptysis; No shortness of breath  CARDIOVASCULAR: No chest pain, palpitations, dizziness, or leg swelling  GASTROINTESTINAL: No abdominal or epigastric pain. No nausea, vomiting, or hematemesis; No diarrhea or constipation. No melena or hematochezia.  GENITOURINARY: No dysuria, frequency, hematuria, or incontinence  NEUROLOGICAL: No headaches, memory loss, loss of strength, numbness, or tremors      Consultant(s) Notes Reviewed:  [x ] YES  [ ] NO    PHYSICAL EXAM:  GENERAL: NAD, well-groomed, well-developed, not in any distress ,  HEAD:  Atraumatic, Normocephalic  EYES: EOMI, PERRLA, conjunctiva and sclera clear  ENMT: No tonsillar erythema, exudates, or enlargement; Moist mucous membranes, Good dentition, No lesions  NECK: Supple, No JVD, Normal thyroid  NERVOUS SYSTEM:  Alert & Oriented X3, No focal deficit   CHEST/LUNG: Good air entry bilateral with no  rales, rhonchi, wheezing, or rubs  HEART: Regular rate and rhythm; No murmurs, rubs, or gallops  ABDOMEN: Soft, Nontender, Nondistended; Bowel sounds present  EXTREMITIES:  2+ Peripheral Pulses, No clubbing, cyanosis, or edema  Care Discussed with Consultants/Other Providers [ x] YES  [ ] NO

## 2019-07-28 LAB
ANION GAP SERPL CALC-SCNC: 13 MMO/L — SIGNIFICANT CHANGE UP (ref 7–14)
BUN SERPL-MCNC: 15 MG/DL — SIGNIFICANT CHANGE UP (ref 7–23)
CALCIUM SERPL-MCNC: 9.6 MG/DL — SIGNIFICANT CHANGE UP (ref 8.4–10.5)
CHLORIDE SERPL-SCNC: 103 MMOL/L — SIGNIFICANT CHANGE UP (ref 98–107)
CO2 SERPL-SCNC: 25 MMOL/L — SIGNIFICANT CHANGE UP (ref 22–31)
CREAT SERPL-MCNC: 1.15 MG/DL — SIGNIFICANT CHANGE UP (ref 0.5–1.3)
GLUCOSE SERPL-MCNC: 95 MG/DL — SIGNIFICANT CHANGE UP (ref 70–99)
HCT VFR BLD CALC: 35.7 % — LOW (ref 39–50)
HGB BLD-MCNC: 11.1 G/DL — LOW (ref 13–17)
MCHC RBC-ENTMCNC: 29 PG — SIGNIFICANT CHANGE UP (ref 27–34)
MCHC RBC-ENTMCNC: 31.1 % — LOW (ref 32–36)
MCV RBC AUTO: 93.2 FL — SIGNIFICANT CHANGE UP (ref 80–100)
NRBC # FLD: 0 K/UL — SIGNIFICANT CHANGE UP (ref 0–0)
PLATELET # BLD AUTO: 180 K/UL — SIGNIFICANT CHANGE UP (ref 150–400)
PMV BLD: 11 FL — SIGNIFICANT CHANGE UP (ref 7–13)
POTASSIUM SERPL-MCNC: 3.5 MMOL/L — SIGNIFICANT CHANGE UP (ref 3.5–5.3)
POTASSIUM SERPL-SCNC: 3.5 MMOL/L — SIGNIFICANT CHANGE UP (ref 3.5–5.3)
RBC # BLD: 3.83 M/UL — LOW (ref 4.2–5.8)
RBC # FLD: 13.6 % — SIGNIFICANT CHANGE UP (ref 10.3–14.5)
SODIUM SERPL-SCNC: 141 MMOL/L — SIGNIFICANT CHANGE UP (ref 135–145)
WBC # BLD: 6.05 K/UL — SIGNIFICANT CHANGE UP (ref 3.8–10.5)
WBC # FLD AUTO: 6.05 K/UL — SIGNIFICANT CHANGE UP (ref 3.8–10.5)

## 2019-07-28 PROCEDURE — 74176 CT ABD & PELVIS W/O CONTRAST: CPT | Mod: 26

## 2019-07-28 RX ADMIN — Medication 1 TABLET(S): at 11:13

## 2019-07-28 RX ADMIN — TAMSULOSIN HYDROCHLORIDE 0.4 MILLIGRAM(S): 0.4 CAPSULE ORAL at 22:43

## 2019-07-28 RX ADMIN — DONEPEZIL HYDROCHLORIDE 5 MILLIGRAM(S): 10 TABLET, FILM COATED ORAL at 22:43

## 2019-07-28 RX ADMIN — CLOPIDOGREL BISULFATE 75 MILLIGRAM(S): 75 TABLET, FILM COATED ORAL at 11:13

## 2019-07-28 RX ADMIN — ATORVASTATIN CALCIUM 10 MILLIGRAM(S): 80 TABLET, FILM COATED ORAL at 22:43

## 2019-07-28 RX ADMIN — Medication 120 MILLIGRAM(S): at 06:23

## 2019-07-28 RX ADMIN — Medication 100 MILLIGRAM(S): at 11:13

## 2019-07-28 NOTE — PROGRESS NOTE ADULT - SUBJECTIVE AND OBJECTIVE BOX
INTERVAL HPI/OVERNIGHT EVENTS: i feel fine . Daughter in room.   Vital Signs Last 24 Hrs  T(C): 36.5 (28 Jul 2019 11:33), Max: 36.7 (27 Jul 2019 20:15)  T(F): 97.7 (28 Jul 2019 11:33), Max: 98.1 (27 Jul 2019 20:15)  HR: 70 (28 Jul 2019 11:33) (70 - 82)  BP: 129/82 (28 Jul 2019 11:33) (129/82 - 154/90)  BP(mean): --  RR: 18 (28 Jul 2019 11:33) (17 - 18)  SpO2: 98% (28 Jul 2019 11:33) (98% - 100%)  I&O's Summary    MEDICATIONS  (STANDING):  atorvastatin 10 milliGRAM(s) Oral at bedtime  clopidogrel Tablet 75 milliGRAM(s) Oral daily  diltiazem    milliGRAM(s) Oral daily  donepezil 5 milliGRAM(s) Oral at bedtime  multivitamin 1 Tablet(s) Oral daily  sodium chloride 0.9%. 1000 milliLiter(s) (75 mL/Hr) IV Continuous <Continuous>  tamsulosin 0.4 milliGRAM(s) Oral at bedtime  thiamine 100 milliGRAM(s) Oral daily    MEDICATIONS  (PRN):  haloperidol    Injectable 0.5 milliGRAM(s) IV Push every 6 hours PRN agitation    LABS:                        11.1   6.05  )-----------( 180      ( 28 Jul 2019 05:30 )             35.7     07-28    141  |  103  |  15  ----------------------------<  95  3.5   |  25  |  1.15    Ca    9.6      28 Jul 2019 05:30          CAPILLARY BLOOD GLUCOSE              REVIEW OF SYSTEMS:  CONSTITUTIONAL: No fever, weight loss, or fatigue  EYES: No eye pain, visual disturbances, or discharge  ENMT:  No difficulty hearing, tinnitus, vertigo; No sinus or throat pain  NECK: No pain or stiffness  RESPIRATORY: No cough, wheezing, chills or hemoptysis; No shortness of breath  CARDIOVASCULAR: No chest pain, palpitations, dizziness, or leg swelling  GASTROINTESTINAL: No abdominal or epigastric pain. No nausea, vomiting, or hematemesis; No diarrhea or constipation. No melena or hematochezia.  GENITOURINARY: No dysuria, frequency, hematuria, or incontinence  NEUROLOGICAL: No headaches, memory loss, loss of strength, numbness, or tremors      Consultant(s) Notes Reviewed:  [x ] YES  [ ] NO    PHYSICAL EXAM:  GENERAL: NAD, well-groomed, well-developed,not in any distress ,  HEAD:  Atraumatic, Normocephalic  EYES: EOMI, PERRLA, conjunctiva and sclera clear  NECK: Supple, No JVD, Normal thyroid  NERVOUS SYSTEM:  Alert & Oriented X3, No focal deficit   CHEST/LUNG: Good air entry bilateral with no  rales, rhonchi, wheezing, or rubs  HEART: Regular rate and rhythm; No murmurs, rubs, or gallops  ABDOMEN: Soft, Nontender, Nondistended; Bowel sounds present, left Inguinal Hernia   EXTREMITIES:  2+ Peripheral Pulses, No clubbing, cyanosis, or edema    Care Discussed with Consultants/Other Providers [ x] YES  [ ] NO

## 2019-07-28 NOTE — CHART NOTE - NSCHARTNOTEFT_GEN_A_CORE
attempted to reach daughter (trevor) twice at phone number listed  brief message left to call back unit and ask to be transferred to provider     this was an attempt to obtain consent for CT abdomen
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
## 2019-07-28 NOTE — CONSULT NOTE ADULT - ASSESSMENT
86 yo man with a hx of afib, HTN, dementia, HLD, BPH, admitted due to worsening behavioral disturbances, with long history of non-obstructed, asymptomatic right inguinal hernia.    - no urgent surgical intervention  - patient may follow up outpatient for elective repair if desired  - care per primary team  - please page surgery if additional questions arise    Patient discussed with Dr. Tommy JUNG Team Surgery  [id number]

## 2019-07-28 NOTE — PROGRESS NOTE ADULT - SUBJECTIVE AND OBJECTIVE BOX
Cardiovascular Disease Progress Note    Overnight events: No acute events overnight. Mr. Crowe denies chest pain or SOB.    Otherwise review of systems negative    Objective Findings:  T(C): 36.3 (07-28-19 @ 06:21), Max: 36.7 (07-27-19 @ 20:15)  HR: 82 (07-28-19 @ 06:21) (75 - 82)  BP: 135/80 (07-28-19 @ 06:21) (114/64 - 154/90)  RR: 17 (07-28-19 @ 06:21) (16 - 17)  SpO2: 100% (07-28-19 @ 06:21) (100% - 100%)  Wt(kg): --  Daily     Daily       Physical Exam:  Gen: NAD  HEENT: EOMI  CV: IIR, normal S1 + S2, no m/r/g  Lungs: CTAB  Abd: soft, non-tender  Ext: No edema    Telemetry: n/a    Laboratory Data:                        11.1   6.05  )-----------( 180      ( 28 Jul 2019 05:30 )             35.7     07-28    141  |  103  |  15  ----------------------------<  95  3.5   |  25  |  1.15    Ca    9.6      28 Jul 2019 05:30                Inpatient Medications:  MEDICATIONS  (STANDING):  atorvastatin 10 milliGRAM(s) Oral at bedtime  clopidogrel Tablet 75 milliGRAM(s) Oral daily  diltiazem    milliGRAM(s) Oral daily  donepezil 5 milliGRAM(s) Oral at bedtime  multivitamin 1 Tablet(s) Oral daily  sodium chloride 0.9%. 1000 milliLiter(s) (75 mL/Hr) IV Continuous <Continuous>  tamsulosin 0.4 milliGRAM(s) Oral at bedtime  thiamine 100 milliGRAM(s) Oral daily      Assessment: 87 year old man with dementia, HTN, HLD, and a-fib presents with behavioral disturbance and worsening dementia.     Plan of Care:    #A-fib-  Currently rate controlled on Cardizem.  Given frequent agitation in the setting of worsening dementia, patient is a high fall risk.  Therefore, the risks of AC outweigh the benefits at this time.    #HTN-  BP acceptable.  Continue current regimen to avoid symptomatic hypotension in the elderly.     #HLD-  Statin as ordered.    CT abdomen for foreign body ingestion, as per primary team.     Over 25 minutes spent on total encounter; more than 50% of the visit was spent counseling and/or coordinating care by the attending physician.      Ronald Williamson MD [facility]  Cardiovascular Disease  [phone]

## 2019-07-29 LAB
ANION GAP SERPL CALC-SCNC: 11 MMO/L — SIGNIFICANT CHANGE UP (ref 7–14)
BUN SERPL-MCNC: 14 MG/DL — SIGNIFICANT CHANGE UP (ref 7–23)
CALCIUM SERPL-MCNC: 9.8 MG/DL — SIGNIFICANT CHANGE UP (ref 8.4–10.5)
CHLORIDE SERPL-SCNC: 106 MMOL/L — SIGNIFICANT CHANGE UP (ref 98–107)
CO2 SERPL-SCNC: 27 MMOL/L — SIGNIFICANT CHANGE UP (ref 22–31)
CREAT SERPL-MCNC: 1.07 MG/DL — SIGNIFICANT CHANGE UP (ref 0.5–1.3)
GLUCOSE SERPL-MCNC: 101 MG/DL — HIGH (ref 70–99)
POTASSIUM SERPL-MCNC: 3.6 MMOL/L — SIGNIFICANT CHANGE UP (ref 3.5–5.3)
POTASSIUM SERPL-SCNC: 3.6 MMOL/L — SIGNIFICANT CHANGE UP (ref 3.5–5.3)
SODIUM SERPL-SCNC: 144 MMOL/L — SIGNIFICANT CHANGE UP (ref 135–145)

## 2019-07-29 RX ADMIN — CLOPIDOGREL BISULFATE 75 MILLIGRAM(S): 75 TABLET, FILM COATED ORAL at 11:19

## 2019-07-29 RX ADMIN — Medication 1 TABLET(S): at 11:19

## 2019-07-29 RX ADMIN — TAMSULOSIN HYDROCHLORIDE 0.4 MILLIGRAM(S): 0.4 CAPSULE ORAL at 21:41

## 2019-07-29 RX ADMIN — ATORVASTATIN CALCIUM 10 MILLIGRAM(S): 80 TABLET, FILM COATED ORAL at 21:41

## 2019-07-29 RX ADMIN — Medication 120 MILLIGRAM(S): at 06:44

## 2019-07-29 RX ADMIN — Medication 100 MILLIGRAM(S): at 11:19

## 2019-07-29 RX ADMIN — DONEPEZIL HYDROCHLORIDE 5 MILLIGRAM(S): 10 TABLET, FILM COATED ORAL at 21:41

## 2019-07-29 NOTE — PROGRESS NOTE ADULT - SUBJECTIVE AND OBJECTIVE BOX
Cardiovascular Disease Progress Note    Overnight events: No acute events overnight. Mr. Crowe is calm this morning. He denies pain.   Otherwise review of systems negative    Objective Findings:  T(C): 36.6 (07-29-19 @ 05:03), Max: 36.7 (07-28-19 @ 22:22)  HR: 79 (07-29-19 @ 05:03) (70 - 88)  BP: 118/66 (07-29-19 @ 05:03) (118/66 - 169/88)  RR: 18 (07-29-19 @ 05:03) (18 - 18)  SpO2: 99% (07-29-19 @ 05:03) (98% - 99%)  Wt(kg): --  Daily     Daily       Physical Exam:  Gen: NAD  HEENT: EOMI  CV: RRR, normal S1 + S2, no m/r/g  Lungs: CTAB  Abd: soft, non-tender  Ext: No edema    Telemetry: n/a    Laboratory Data:                        11.1   6.05  )-----------( 180      ( 28 Jul 2019 05:30 )             35.7     07-28    141  |  103  |  15  ----------------------------<  95  3.5   |  25  |  1.15    Ca    9.6      28 Jul 2019 05:30                Inpatient Medications:  MEDICATIONS  (STANDING):  atorvastatin 10 milliGRAM(s) Oral at bedtime  clopidogrel Tablet 75 milliGRAM(s) Oral daily  diltiazem    milliGRAM(s) Oral daily  donepezil 5 milliGRAM(s) Oral at bedtime  multivitamin 1 Tablet(s) Oral daily  sodium chloride 0.9%. 1000 milliLiter(s) (75 mL/Hr) IV Continuous <Continuous>  tamsulosin 0.4 milliGRAM(s) Oral at bedtime  thiamine 100 milliGRAM(s) Oral daily      Assessment: 87 year old man with dementia, HTN, HLD, and a-fib presents with behavioral disturbance and worsening dementia.     Plan of Care:    #A-fib-  Currently rate controlled on Cardizem.  Given frequent agitation in the setting of worsening dementia, patient is a high fall risk.  Therefore, the risks of AC outweigh the benefits at this time.    #HTN-  BP acceptable.  Continue current regimen to avoid symptomatic hypotension in the elderly.     #HLD-  Statin as ordered.      Over 25 minutes spent on total encounter; more than 50% of the visit was spent counseling and/or coordinating care by the attending physician.      Ronald Williamson MD [facility]  Cardiovascular Disease  [phone]

## 2019-07-29 NOTE — PROGRESS NOTE ADULT - SUBJECTIVE AND OBJECTIVE BOX
INTERVAL HPI/OVERNIGHT EVENTS: I feel fine.   Vital Signs Last 24 Hrs  T(C): 36.6 (29 Jul 2019 05:03), Max: 36.7 (28 Jul 2019 22:22)  T(F): 97.8 (29 Jul 2019 05:03), Max: 98.1 (28 Jul 2019 22:22)  HR: 79 (29 Jul 2019 05:03) (79 - 88)  BP: 118/66 (29 Jul 2019 05:03) (118/66 - 169/88)  BP(mean): --  RR: 18 (29 Jul 2019 05:03) (18 - 18)  SpO2: 99% (29 Jul 2019 05:03) (98% - 99%)  I&O's Summary    MEDICATIONS  (STANDING):  atorvastatin 10 milliGRAM(s) Oral at bedtime  clopidogrel Tablet 75 milliGRAM(s) Oral daily  diltiazem    milliGRAM(s) Oral daily  donepezil 5 milliGRAM(s) Oral at bedtime  multivitamin 1 Tablet(s) Oral daily  sodium chloride 0.9%. 1000 milliLiter(s) (75 mL/Hr) IV Continuous <Continuous>  tamsulosin 0.4 milliGRAM(s) Oral at bedtime  thiamine 100 milliGRAM(s) Oral daily    MEDICATIONS  (PRN):  haloperidol    Injectable 0.5 milliGRAM(s) IV Push every 6 hours PRN agitation    LABS:                        11.1   6.05  )-----------( 180      ( 28 Jul 2019 05:30 )             35.7     07-29    144  |  106  |  14  ----------------------------<  101<H>  3.6   |  27  |  1.07    Ca    9.8      29 Jul 2019 06:15          CAPILLARY BLOOD GLUCOSE              REVIEW OF SYSTEMS:  CONSTITUTIONAL: No fever, weight loss, or fatigue  EYES: No eye pain, visual disturbances, or discharge  ENMT:  No difficulty hearing, tinnitus, vertigo; No sinus or throat pain  RESPIRATORY: No cough, wheezing, chills or hemoptysis; No shortness of breath  CARDIOVASCULAR: No chest pain, palpitations, dizziness, or leg swelling  GASTROINTESTINAL: No abdominal or epigastric pain. No nausea, vomiting, or hematemesis; No diarrhea or constipation. No melena or hematochezia.  GENITOURINARY: No dysuria, frequency, hematuria, or incontinence  NEUROLOGICAL: No headaches, memory loss, loss of strength, numbness, or tremors  :    Consultant(s) Notes Reviewed:  [x ] YES  [ ] NO    PHYSICAL EXAM:  GENERAL: NAD, well-groomed, well-developed,not in any distress ,  HEAD:  Atraumatic, Normocephalic  EYES: EOMI, PERRLA, conjunctiva and sclera clear  ENMT: No tonsillar erythema, exudates, or enlargement; Moist mucous membranes, Good dentition, No lesions  NECK: Supple, No JVD, Normal thyroid  NERVOUS SYSTEM:  Alert & Oriented X3, No focal deficit   CHEST/LUNG: Good air entry bilateral with no  rales, rhonchi, wheezing, or rubs  HEART: Regular rate and rhythm; No murmurs, rubs, or gallops  ABDOMEN: Soft, Nontender, Nondistended; Bowel sounds present, left inguinal hernia   EXTREMITIES:  2+ Peripheral Pulses, No clubbing, cyanosis, or edema  SKIN: No rashes or lesions    Care Discussed with Consultants/Other Providers [ x] YES  [ ] NO INTERVAL HPI/OVERNIGHT EVENTS: I feel fine. Confused .   Vital Signs Last 24 Hrs  T(C): 36.6 (29 Jul 2019 05:03), Max: 36.7 (28 Jul 2019 22:22)  T(F): 97.8 (29 Jul 2019 05:03), Max: 98.1 (28 Jul 2019 22:22)  HR: 79 (29 Jul 2019 05:03) (79 - 88)  BP: 118/66 (29 Jul 2019 05:03) (118/66 - 169/88)  BP(mean): --  RR: 18 (29 Jul 2019 05:03) (18 - 18)  SpO2: 99% (29 Jul 2019 05:03) (98% - 99%)  I&O's Summary    MEDICATIONS  (STANDING):  atorvastatin 10 milliGRAM(s) Oral at bedtime  clopidogrel Tablet 75 milliGRAM(s) Oral daily  diltiazem    milliGRAM(s) Oral daily  donepezil 5 milliGRAM(s) Oral at bedtime  multivitamin 1 Tablet(s) Oral daily  sodium chloride 0.9%. 1000 milliLiter(s) (75 mL/Hr) IV Continuous <Continuous>  tamsulosin 0.4 milliGRAM(s) Oral at bedtime  thiamine 100 milliGRAM(s) Oral daily    MEDICATIONS  (PRN):  haloperidol    Injectable 0.5 milliGRAM(s) IV Push every 6 hours PRN agitation    LABS:                        11.1   6.05  )-----------( 180      ( 28 Jul 2019 05:30 )             35.7     07-29    144  |  106  |  14  ----------------------------<  101<H>  3.6   |  27  |  1.07    Ca    9.8      29 Jul 2019 06:15          CAPILLARY BLOOD GLUCOSE              REVIEW OF SYSTEMS:  CONSTITUTIONAL: No fever, weight loss, or fatigue  EYES: No eye pain, visual disturbances, or discharge  ENMT:  No difficulty hearing, tinnitus, vertigo; No sinus or throat pain  RESPIRATORY: No cough, wheezing, chills or hemoptysis; No shortness of breath  CARDIOVASCULAR: No chest pain, palpitations, dizziness, or leg swelling  GASTROINTESTINAL: No abdominal or epigastric pain. No nausea, vomiting, or hematemesis; No diarrhea or constipation. No melena or hematochezia.  GENITOURINARY: No dysuria, frequency, hematuria, or incontinence  NEUROLOGICAL: confused   :    Consultant(s) Notes Reviewed:  [x ] YES  [ ] NO    PHYSICAL EXAM:  GENERAL: NAD, well-groomed, well-developed,not in any distress ,  HEAD:  Atraumatic, Normocephalic  EYES: EOMI, PERRLA, conjunctiva and sclera clear  ENMT: No tonsillar erythema, exudates, or enlargement; Moist mucous membranes, Good dentition, No lesions  NECK: Supple, No JVD, Normal thyroid  NERVOUS SYSTEM:  Alert & Oriented X1, No focal deficit   CHEST/LUNG: Good air entry bilateral with no  rales, rhonchi, wheezing, or rubs  HEART: Regular rate and rhythm; No murmurs, rubs, or gallops  ABDOMEN: Soft, Nontender, Nondistended; Bowel sounds present, left inguinal hernia   EXTREMITIES:  2+ Peripheral Pulses, No clubbing, cyanosis, or edema  SKIN: No rashes or lesions    Care Discussed with Consultants/Other Providers [ x] YES  [ ] NO

## 2019-07-30 PROCEDURE — 93306 TTE W/DOPPLER COMPLETE: CPT | Mod: 26

## 2019-07-30 RX ADMIN — DONEPEZIL HYDROCHLORIDE 5 MILLIGRAM(S): 10 TABLET, FILM COATED ORAL at 21:44

## 2019-07-30 RX ADMIN — CLOPIDOGREL BISULFATE 75 MILLIGRAM(S): 75 TABLET, FILM COATED ORAL at 11:14

## 2019-07-30 RX ADMIN — Medication 1 TABLET(S): at 11:14

## 2019-07-30 RX ADMIN — ATORVASTATIN CALCIUM 10 MILLIGRAM(S): 80 TABLET, FILM COATED ORAL at 21:44

## 2019-07-30 RX ADMIN — Medication 100 MILLIGRAM(S): at 11:15

## 2019-07-30 RX ADMIN — Medication 120 MILLIGRAM(S): at 05:48

## 2019-07-30 RX ADMIN — TAMSULOSIN HYDROCHLORIDE 0.4 MILLIGRAM(S): 0.4 CAPSULE ORAL at 21:44

## 2019-07-30 NOTE — PROGRESS NOTE ADULT - ATTENDING COMMENTS
88y/o admitted for behavior disturbance, found with Lt inguino-scrotal hernia containing colon.    - poss hernia repair during this hospital stay if medically cleared and if consented by family

## 2019-07-30 NOTE — PROGRESS NOTE ADULT - SUBJECTIVE AND OBJECTIVE BOX
Cardiovascular Disease Progress Note    Overnight events: No acute events overnight. Mr. Crowe is refusing to eat breakfast stating that he thinks there are sedatives in the food.     Otherwise review of systems negative    Objective Findings:  T(C): 36.7 (07-30-19 @ 05:47), Max: 36.7 (07-30-19 @ 05:47)  HR: 82 (07-30-19 @ 05:47) (68 - 84)  BP: 137/88 (07-30-19 @ 05:47) (114/65 - 137/88)  RR: 18 (07-30-19 @ 05:47) (18 - 18)  SpO2: 98% (07-30-19 @ 05:47) (96% - 100%)  Wt(kg): --  Daily     Daily       Physical Exam:  Gen: NAD  HEENT: EOMI  CV: RRR, normal S1 + S2, no m/r/g  Lungs: CTAB  Abd: soft, non-tender  Ext: No edema    Telemetry: n/a    Laboratory Data:    07-29    144  |  106  |  14  ----------------------------<  101<H>  3.6   |  27  |  1.07    Ca    9.8      29 Jul 2019 06:15                Inpatient Medications:  MEDICATIONS  (STANDING):  atorvastatin 10 milliGRAM(s) Oral at bedtime  clopidogrel Tablet 75 milliGRAM(s) Oral daily  diltiazem    milliGRAM(s) Oral daily  donepezil 5 milliGRAM(s) Oral at bedtime  multivitamin 1 Tablet(s) Oral daily  sodium chloride 0.9%. 1000 milliLiter(s) (75 mL/Hr) IV Continuous <Continuous>  tamsulosin 0.4 milliGRAM(s) Oral at bedtime  thiamine 100 milliGRAM(s) Oral daily      Assessment: 87 year old man with dementia, HTN, HLD, and a-fib presents with behavioral disturbance and worsening dementia.     Plan of Care:    #A-fib-  Currently rate controlled on Cardizem.  Given frequent agitation in the setting of worsening dementia, patient is a high fall risk.  Therefore, the risks of AC outweigh the benefits at this time.    #HTN-  BP acceptable.  Continue current regimen to avoid symptomatic hypotension in the elderly.     #HLD-  Statin as ordered.    Over 25 minutes spent on total encounter; more than 50% of the visit was spent counseling and/or coordinating care by the attending physician.      Ronald Williamson MD [facility]  Cardiovascular Disease  [phone]

## 2019-07-30 NOTE — CHART NOTE - NSCHARTNOTEFT_GEN_A_CORE
I called pt daughter Cindy to discuss possible left Inguinal hernia repair while pt in hospital.   left VM at [phone].  will try again to reach her tomorrow.

## 2019-07-30 NOTE — PROGRESS NOTE ADULT - SUBJECTIVE AND OBJECTIVE BOX
INTERVAL HPI/OVERNIGHT EVENTS: i feel fine.   Vital Signs Last 24 Hrs  T(C): 36.7 (30 Jul 2019 11:29), Max: 36.7 (30 Jul 2019 05:47)  T(F): 98.1 (30 Jul 2019 11:29), Max: 98.1 (30 Jul 2019 11:29)  HR: 92 (30 Jul 2019 11:29) (68 - 92)  BP: 119/61 (30 Jul 2019 11:29) (119/61 - 137/88)  BP(mean): --  RR: 18 (30 Jul 2019 11:29) (18 - 18)  SpO2: 98% (30 Jul 2019 11:29) (96% - 98%)  I&O's Summary    MEDICATIONS  (STANDING):  atorvastatin 10 milliGRAM(s) Oral at bedtime  clopidogrel Tablet 75 milliGRAM(s) Oral daily  diltiazem    milliGRAM(s) Oral daily  donepezil 5 milliGRAM(s) Oral at bedtime  multivitamin 1 Tablet(s) Oral daily  sodium chloride 0.9%. 1000 milliLiter(s) (75 mL/Hr) IV Continuous <Continuous>  tamsulosin 0.4 milliGRAM(s) Oral at bedtime  thiamine 100 milliGRAM(s) Oral daily    MEDICATIONS  (PRN):  haloperidol    Injectable 0.5 milliGRAM(s) IV Push every 6 hours PRN agitation    LABS:    07-29    144  |  106  |  14  ----------------------------<  101<H>  3.6   |  27  |  1.07    Ca    9.8      29 Jul 2019 06:15          CAPILLARY BLOOD GLUCOSE              REVIEW OF SYSTEMS:  CONSTITUTIONAL: No fever, weight loss, or fatigue  EYES: No eye pain, visual disturbances, or discharge  ENMT:  No difficulty hearing, tinnitus, vertigo; No sinus or throat pain  NECK: No pain or stiffness  RESPIRATORY: No cough, wheezing, chills or hemoptysis; No shortness of breath  CARDIOVASCULAR: No chest pain, palpitations, dizziness, or leg swelling  GASTROINTESTINAL: No abdominal or epigastric pain. No nausea, vomiting, or hematemesis; No diarrhea or constipation. No melena or hematochezia.  GENITOURINARY: No dysuria, frequency, hematuria, or incontinence      Consultant(s) Notes Reviewed:  [x ] YES  [ ] NO    PHYSICAL EXAM:  GENERAL: NAD, well-groomed, well-developed,not in any distress ,  HEAD:  Atraumatic, Normocephalic  EYES: EOMI, PERRLA, conjunctiva and sclera clear  ENMT: No tonsillar erythema, exudates, or enlargement; Moist mucous membranes, Good dentition, No lesions  NECK: Supple, No JVD, Normal thyroid  NERVOUS SYSTEM:  Alert & Oriented X2, No focal deficit   CHEST/LUNG: Good air entry bilateral with no  rales, rhonchi, wheezing, or rubs  HEART: Regular rate and rhythm; No murmurs, rubs, or gallops  ABDOMEN: Soft, Nontender, Nondistended; Bowel sounds present .left inguinal swelling   EXTREMITIES:  2+ Peripheral Pulses, No clubbing, cyanosis, or edema  SKIN: No rashes or lesions    Care Discussed with Consultants/Other Providers [ x] YES  [ ] NO

## 2019-07-31 VITALS
TEMPERATURE: 98 F | OXYGEN SATURATION: 98 % | DIASTOLIC BLOOD PRESSURE: 77 MMHG | HEART RATE: 68 BPM | RESPIRATION RATE: 18 BRPM | SYSTOLIC BLOOD PRESSURE: 119 MMHG

## 2019-07-31 RX ADMIN — Medication 120 MILLIGRAM(S): at 05:35

## 2019-07-31 NOTE — PROGRESS NOTE ADULT - ASSESSMENT
87M h/o dementia, HTN, afib, BPH, brought in by EMS after leaving home, and daughter reports she can no longer take care of him at home, a/f behavioral disturbance and assistance with placement:      Problem/Plan - 1:  ·  Problem: Alzheimer's dementia with behavioral disturbance, unspecified timing of dementia onset.  Plan: -Psychiatry consult noted.   -QTc 450, treat agitation with IV haldol .5 mg q6 prn, behavioral health c/s in am   -SW and CM working with daughter to find placement as planning to take him home.   -c/w donepezil.     Problem/Plan - 2:  ·  Problem: Foreign body in intestine, initial encounter.  Plan: -questionable foreign body seen on CXR   -Abd Xary confirming .CT abdomen results noted..  Left Inguinal Hernia so Surgery consulted.      Problem/Plan - 3:  ·  Problem: Atrial fibrillation, unspecified type.  Plan: - rate controlled  - c/w cardizem  -Cardiology consult noted.      Problem/Plan - 4:  ·  Problem: Hypercalcemia.  Plan: c/w IVF and reassess in am.      Problem/Plan - 5:  ·  Problem: Coronary artery disease, angina presence unspecified, unspecified vessel or lesion type, unspecified whether native or transplanted heart. Plan: -cardiology helping.   -c/w statin.     Problem/Plan - 6:  Problem: Anemia, unspecified type.Plan: stable from 2016, continue to monitor.     Problem/Plan - 7:  ·  Problem: Benign prostatic hyperplasia, unspecified whether lower urinary tract symptoms present. Plan: -c/w flomax.     Problem/Plan - 8:  ·  Problem: CKD (chronic kidney disease) stage 3, GFR 30-59 ml/min.  Plan: renally dose meds, outpt f/u.      Problem/Plan - 9:  ·  Problem: Need for prophylactic measure.  Plan: Improve score 1, no dvt ppx indicated.       Disposition : DC planning home . D/W daughter .
87M h/o dementia, HTN, afib, BPH, brought in by EMS after leaving home, and daughter reports she can no longer take care of him at home, a/f behavioral disturbance and assistance with placement:      Problem/Plan - 1:  ·  Problem: Alzheimer's dementia with behavioral disturbance, unspecified timing of dementia onset.  Plan: -Psychiatry consult noted.   -QTc 450, treat agitation with IV haldol .5 mg q6 prn, behavioral health c/s in am   -SW and CM working with daughter to find placement as planning to take him home.   -c/w donepezil.     Problem/Plan - 2:  ·  Problem: Foreign body in intestine, initial encounter.  Plan: -questionable foreign body seen on CXR   -Abd Xary confirming .CT abdomen results noted..  Left Inguinal Hernia so Surgery consult noted. Outpt follow up .      Problem/Plan - 3:  ·  Problem: Atrial fibrillation, unspecified type.  Plan: - rate controlled  - c/w cardizem  -Cardiology consult noted.      Problem/Plan - 4:  ·  Problem: Hypercalcemia.  Plan: c/w IVF and reassess in am.      Problem/Plan - 5:  ·  Problem: Coronary artery disease, angina presence unspecified, unspecified vessel or lesion type, unspecified whether native or transplanted heart. Plan: -cardiology helping.   -c/w statin.     Problem/Plan - 6:  Problem: Anemia, unspecified type.Plan: stable from 2016, continue to monitor.     Problem/Plan - 7:  ·  Problem: Benign prostatic hyperplasia, unspecified whether lower urinary tract symptoms present. Plan: -c/w flomax.     Problem/Plan - 8:  ·  Problem: CKD (chronic kidney disease) stage 3, GFR 30-59 ml/min.  Plan: renally dose meds, outpt f/u.      Problem/Plan - 9:  ·  Problem: Need for prophylactic measure.  Plan: Improve score 1, no dvt ppx indicated.       Disposition : DC planning to [facility]  .
86y/o admitted for behavior disturbance, found with stable, reducible Lt inguino-scrotal hernia containing colon.    - possible hernia repair during this hospital stay if cleared and family consents  - Please obtain medical clearance for hernia repair
87M h/o dementia, HTN, afib, BPH, brought in by EMS after leaving home, and daughter reports she can no longer take care of him at home, a/f behavioral disturbance and assistance with placement:      Problem/Plan - 1:  ·  Problem: Alzheimer's dementia with behavioral disturbance, unspecified timing of dementia onset.  Plan: -Psychiatry consult noted.   -QTc 450, treat agitation with IV haldol .5 mg q6 prn, behavioral health c/s in am   -SW and CM working with daughter to find placement as planning to take him home.   -c/w donepezil.     Problem/Plan - 2:  ·  Problem: Foreign body in intestine, initial encounter.  Plan: -questionable foreign body seen on CXR   -Abd Xary confirming .CT abdomen results noted..  Left Inguinal Hernia so Surgery helping and awaiting family decision regarding surgery .      Problem/Plan - 3:  ·  Problem: Atrial fibrillation, unspecified type.  Plan: - rate controlled  - c/w cardizem  -Cardiology consult noted.      Problem/Plan - 4:  ·  Problem: Hypercalcemia.  Plan: c/w IVF and reassess in am.      Problem/Plan - 5:  ·  Problem: Coronary artery disease, angina presence unspecified, unspecified vessel or lesion type, unspecified whether native or transplanted heart. Plan: -cardiology helping.   -c/w statin.     Problem/Plan - 6:  Problem: Anemia, unspecified type.Plan: stable from 2016, continue to monitor.     Problem/Plan - 7:  ·  Problem: Benign prostatic hyperplasia, unspecified whether lower urinary tract symptoms present. Plan: -c/w flomax.     Problem/Plan - 8:  ·  Problem: CKD (chronic kidney disease) stage 3, GFR 30-59 ml/min.  Plan: renally dose meds, outpt f/u.      Problem/Plan - 9:  ·  Problem: Need for prophylactic measure.  Plan: Improve score 1, no dvt ppx indicated.       Disposition : DC planning pending decision about surgery .
87M h/o dementia, HTN, afib, BPH, brought in by EMS after leaving home, and daughter reports she can no longer take care of him at home, a/f behavioral disturbance and assistance with placement:      Problem/Plan - 1:  ·  Problem: Alzheimer's dementia with behavioral disturbance, unspecified timing of dementia onset.  Plan: -Psychiatry consult noted.   -QTc 450, treat agitation with IV haldol .5 mg q6 prn, behavioral health c/s in am   -SW and CM working with daughter to find placement as planning to take him home.   -c/w donepezil.     Problem/Plan - 2:  ·  Problem: Foreign body in intestine, initial encounter.  Plan: -questionable foreign body seen on CXR   -Abd Xary confirming .CT abdomen results noted..  Left Inguinal Hernia so Surgery helping and family refusing surgery .      Problem/Plan - 3:  ·  Problem: Atrial fibrillation, unspecified type.  Plan: - rate controlled  - c/w cardizem  -Cardiology consult noted.      Problem/Plan - 4:  ·  Problem: Hypercalcemia.  Plan: c/w IVF and reassess in am.      Problem/Plan - 5:  ·  Problem: Coronary artery disease, angina presence unspecified, unspecified vessel or lesion type, unspecified whether native or transplanted heart. Plan: -cardiology helping.   -c/w statin.     Problem/Plan - 6:  Problem: Anemia, unspecified type.Plan: stable from 2016, continue to monitor.     Problem/Plan - 7:  ·  Problem: Benign prostatic hyperplasia, unspecified whether lower urinary tract symptoms present. Plan: -c/w flomax.     Problem/Plan - 8:  ·  Problem: CKD (chronic kidney disease) stage 3, GFR 30-59 ml/min.  Plan: renally dose meds, outpt f/u.      Problem/Plan - 9:  ·  Problem: Need for prophylactic measure.  Plan: Improve score 1, no dvt ppx indicated.       Disposition : DC planning .
87M h/o dementia, HTN, afib, BPH, brought in by EMS after leaving home, and daughter reports she can no longer take care of him at home, a/f behavioral disturbance and assistance with placement:      Problem/Plan - 1:  ·  Problem: Alzheimer's dementia with behavioral disturbance, unspecified timing of dementia onset.  Plan: -Psychiatry consult noted.   -QTc 450, treat agitation with IV haldol .5 mg q6 prn, behavioral health c/s in am   -SW and CM working with daughter to find placement as planning to take him home.   -c/w donepezil.     Problem/Plan - 2:  ·  Problem: Foreign body in intestine, initial encounter.  Plan: -questionable foreign body seen on CXR   -Abd Xary confirming so awaiting CT abdomen and possibly Surgery/GI consult.      Problem/Plan - 3:  ·  Problem: Atrial fibrillation, unspecified type.  Plan: - rate controlled  - c/w cardizem  -Cardiology consult noted.      Problem/Plan - 4:  ·  Problem: Hypercalcemia.  Plan: c/w IVF and reassess in am.      Problem/Plan - 5:  ·  Problem: Coronary artery disease, angina presence unspecified, unspecified vessel or lesion type, unspecified whether native or transplanted heart. Plan: -cardiology helping.   -c/w statin.     Problem/Plan - 6:  Problem: Anemia, unspecified type.Plan: stable from 2016, continue to monitor.     Problem/Plan - 7:  ·  Problem: Benign prostatic hyperplasia, unspecified whether lower urinary tract symptoms present. Plan: -c/w flomax.     Problem/Plan - 8:  ·  Problem: CKD (chronic kidney disease) stage 3, GFR 30-59 ml/min.  Plan: renally dose meds, outpt f/u.      Problem/Plan - 9:  ·  Problem: Need for prophylactic measure.  Plan: Improve score 1, no dvt ppx indicated.
87M h/o dementia, HTN, afib, BPH, brought in by EMS after leaving home, and daughter reports she can no longer take care of him at home, a/f behavioral disturbance and assistance with placement:      Problem/Plan - 1:  ·  Problem: Alzheimer's dementia with behavioral disturbance, unspecified timing of dementia onset.  Plan: -Psychiatry consult noted.   -QTc 450, treat agitation with IV haldol .5 mg q6 prn, behavioral health c/s in am   -SW and CM working with daughter to find placement as planning to take him home.   -c/w donepezil.     Problem/Plan - 2:  ·  Problem: Foreign body in intestine, initial encounter.  Plan: -questionable foreign body seen on CXR   -Abd Xary confirming so will get CT abdomen.      Problem/Plan - 3:  ·  Problem: Atrial fibrillation, unspecified type.  Plan: - rate controlled  - c/w cardizem  -Cardiology consult noted.      Problem/Plan - 4:  ·  Problem: Hypercalcemia.  Plan: c/w IVF and reassess in am.      Problem/Plan - 5:  ·  Problem: Coronary artery disease, angina presence unspecified, unspecified vessel or lesion type, unspecified whether native or transplanted heart. Plan: -cardiology helping.   -c/w statin.     Problem/Plan - 6:  Problem: Anemia, unspecified type.Plan: stable from 2016, continue to monitor.     Problem/Plan - 7:  ·  Problem: Benign prostatic hyperplasia, unspecified whether lower urinary tract symptoms present. Plan: -c/w flomax.     Problem/Plan - 8:  ·  Problem: CKD (chronic kidney disease) stage 3, GFR 30-59 ml/min.  Plan: renally dose meds, outpt f/u.      Problem/Plan - 9:  ·  Problem: Need for prophylactic measure.  Plan: Improve score 1, no dvt ppx indicated.

## 2019-07-31 NOTE — PROGRESS NOTE ADULT - REASON FOR ADMISSION
behavioral disturbance

## 2019-07-31 NOTE — PROGRESS NOTE ADULT - PROVIDER SPECIALTY LIST ADULT
Cardiology
Internal Medicine
Surgery
Surgery
Cardiology
Cardiology
Internal Medicine
Internal Medicine

## 2019-07-31 NOTE — PROGRESS NOTE ADULT - SUBJECTIVE AND OBJECTIVE BOX
INTERVAL HPI/OVERNIGHT EVENTS: Pt resting comfortably. No complaints. States he has been tolerating diet without issue and had a BM yesterday morning. Denies any CP, SOB, N/V, diarrhea/constipation.       Physical Exam:  Gen: Well-nourished, well-developed, in no acute distress   CVS: RRR, no M/R/G   Resp: CTABL, no wheezes, ronchi, or rales. Normal respiratory effort.   Abdominal: Soft, NT, ND +BS, moderate sized easily reducible, Left inguino-scrotal hernia, containing bowel, soft, NT.        MEDICATIONS  (STANDING):  atorvastatin 10 milliGRAM(s) Oral at bedtime  clopidogrel Tablet 75 milliGRAM(s) Oral daily  diltiazem    milliGRAM(s) Oral daily  donepezil 5 milliGRAM(s) Oral at bedtime  multivitamin 1 Tablet(s) Oral daily  sodium chloride 0.9%. 1000 milliLiter(s) (75 mL/Hr) IV Continuous <Continuous>  tamsulosin 0.4 milliGRAM(s) Oral at bedtime  thiamine 100 milliGRAM(s) Oral daily    MEDICATIONS  (PRN):  haloperidol    Injectable 0.5 milliGRAM(s) IV Push every 6 hours PRN agitation    Vital Signs Last 24 Hrs  T(C): 36.6 (31 Jul 2019 05:34), Max: 36.7 (30 Jul 2019 11:29)  T(F): 97.9 (31 Jul 2019 05:34), Max: 98.1 (30 Jul 2019 11:29)  HR: 78 (31 Jul 2019 05:34) (62 - 92)  BP: 140/82 (31 Jul 2019 05:34) (119/61 - 140/82)  BP(mean): --  RR: 18 (31 Jul 2019 05:34) (17 - 18)  SpO2: 99% (31 Jul 2019 05:34) (98% - 99%)

## 2019-07-31 NOTE — DISCHARGE NOTE NURSING/CASE MANAGEMENT/SOCIAL WORK - NSDCDPATPORTLINK_GEN_ALL_CORE
You can access the [facility] Patient Portal, offered by [facility], by registering with the following website: [email or web address]

## 2019-07-31 NOTE — CHART NOTE - NSCHARTNOTEFT_GEN_A_CORE
Received a call from patient's daughter Melba [phone] who states she does not want her father to undergo hernia repair surgery since he is asymptomatic at this time. Patient prefers to hold off at this time as well. Will follow up with surgery outpatient. Dr. En gardner.

## 2019-07-31 NOTE — PROGRESS NOTE ADULT - SUBJECTIVE AND OBJECTIVE BOX
Cardiovascular Disease Progress Note    Overnight events: No acute events overnight. Mr. Crowe is ambulating.  Otherwise review of systems negative    Objective Findings:  T(C): 36.6 (07-31-19 @ 05:34), Max: 36.7 (07-30-19 @ 11:29)  HR: 78 (07-31-19 @ 05:34) (62 - 92)  BP: 140/82 (07-31-19 @ 05:34) (119/61 - 140/82)  RR: 18 (07-31-19 @ 05:34) (17 - 18)  SpO2: 99% (07-31-19 @ 05:34) (98% - 99%)  Wt(kg): --  Daily     Daily       Physical Exam:  Gen: NAD  HEENT: EOMI  CV: RRR, normal S1 + S2, no m/r/g  Lungs: CTAB  Abd: soft, non-tender  Ext: No edema    Telemetry: n/a    Laboratory Data:    No recent labs.           Inpatient Medications:  MEDICATIONS  (STANDING):  atorvastatin 10 milliGRAM(s) Oral at bedtime  clopidogrel Tablet 75 milliGRAM(s) Oral daily  diltiazem    milliGRAM(s) Oral daily  donepezil 5 milliGRAM(s) Oral at bedtime  multivitamin 1 Tablet(s) Oral daily  sodium chloride 0.9%. 1000 milliLiter(s) (75 mL/Hr) IV Continuous <Continuous>  tamsulosin 0.4 milliGRAM(s) Oral at bedtime  thiamine 100 milliGRAM(s) Oral daily      Assessment: 87 year old man with dementia, HTN, HLD, and a-fib presents with behavioral disturbance and worsening dementia.     Plan of Care:    #A-fib-  Currently rate controlled on Cardizem.  Given frequent agitation in the setting of worsening dementia, patient is a high fall risk.  Therefore, the risks of AC outweigh the benefits at this time.  TTE reviewed- mild LVH with preserved LVEF.     #HTN-  BP acceptable.  Continue current regimen to avoid symptomatic hypotension in the elderly.     #HLD-  Statin as ordered.    Over 25 minutes spent on total encounter; more than 50% of the visit was spent counseling and/or coordinating care by the attending physician.      Ronald Williamson MD [facility]  Cardiovascular Disease  [phone]

## 2019-07-31 NOTE — PROGRESS NOTE ADULT - SUBJECTIVE AND OBJECTIVE BOX
INTERVAL HPI/OVERNIGHT EVENTS: Seen and examined with daughter in room . daughter Dont want surgery .  Vital Signs Last 24 Hrs  T(C): 36.7 (31 Jul 2019 11:19), Max: 36.7 (30 Jul 2019 19:14)  T(F): 98.1 (31 Jul 2019 11:19), Max: 98.1 (30 Jul 2019 19:14)  HR: 68 (31 Jul 2019 11:19) (62 - 78)  BP: 119/77 (31 Jul 2019 11:19) (119/77 - 140/82)  BP(mean): --  RR: 18 (31 Jul 2019 11:19) (17 - 18)  SpO2: 98% (31 Jul 2019 11:19) (98% - 99%)  I&O's Summary    MEDICATIONS  (STANDING):  atorvastatin 10 milliGRAM(s) Oral at bedtime  clopidogrel Tablet 75 milliGRAM(s) Oral daily  diltiazem    milliGRAM(s) Oral daily  donepezil 5 milliGRAM(s) Oral at bedtime  multivitamin 1 Tablet(s) Oral daily  sodium chloride 0.9%. 1000 milliLiter(s) (75 mL/Hr) IV Continuous <Continuous>  tamsulosin 0.4 milliGRAM(s) Oral at bedtime  thiamine 100 milliGRAM(s) Oral daily    MEDICATIONS  (PRN):  haloperidol    Injectable 0.5 milliGRAM(s) IV Push every 6 hours PRN agitation    LABS:              CAPILLARY BLOOD GLUCOSE              REVIEW OF SYSTEMS:  CONSTITUTIONAL: No fever, weight loss, or fatigue  EYES: No eye pain, visual disturbances, or discharge  ENMT:  No difficulty hearing, tinnitus, vertigo; No sinus or throat pain  RESPIRATORY: No cough, wheezing, chills or hemoptysis; No shortness of breath  CARDIOVASCULAR: No chest pain, palpitations, dizziness, or leg swelling  GASTROINTESTINAL: No abdominal or epigastric pain. No nausea, vomiting, or hematemesis; No diarrhea or constipation. No melena or hematochezia.  GENITOURINARY: No dysuria, frequency, hematuria, or incontinence  NEUROLOGICAL: No headaches, memory loss, loss of strength, numbness, or tremors    Consultant(s) Notes Reviewed:  [x ] YES  [ ] NO    PHYSICAL EXAM:  GENERAL: NAD, well-groomed, well-developed,not in any distress ,  HEAD:  Atraumatic, Normocephalic  EYES: EOMI, PERRLA, conjunctiva and sclera clear  ENMT: No tonsillar erythema, exudates, or enlargement; Moist mucous membranes, Good dentition, No lesions  NECK: Supple, No JVD, Normal thyroid  NERVOUS SYSTEM:  Alert & Oriented X1, No focal deficit   CHEST/LUNG: Good air entry bilateral with no  rales, rhonchi, wheezing, or rubs  HEART: Regular rate and rhythm; No murmurs, rubs, or gallops  ABDOMEN: Soft, Nontender, Nondistended; Bowel sounds present  EXTREMITIES:  2+ Peripheral Pulses, No clubbing, cyanosis, or edema    Care Discussed with Consultants/Other Providers [ x] YES  [ ] NO

## 2019-08-11 NOTE — H&P ADULT - NSHPOUTPATIENTPROVIDERS_GEN_ALL_CORE
PMD- not in all scripts, no PMD listed yes Ramsey Carrasquillo listed as PMD, but no records in all scripts

## 2019-11-12 ENCOUNTER — INPATIENT (INPATIENT)
Facility: HOSPITAL | Age: 84
LOS: 9 days | Discharge: HOME | End: 2019-11-22
Attending: INTERNAL MEDICINE | Admitting: INTERNAL MEDICINE
Payer: MEDICARE

## 2019-11-12 VITALS
OXYGEN SATURATION: 99 % | DIASTOLIC BLOOD PRESSURE: 76 MMHG | HEART RATE: 95 BPM | SYSTOLIC BLOOD PRESSURE: 151 MMHG | TEMPERATURE: 98 F | RESPIRATION RATE: 18 BRPM

## 2019-11-12 DIAGNOSIS — F03.90 UNSPECIFIED DEMENTIA WITHOUT BEHAVIORAL DISTURBANCE: ICD-10-CM
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
## 2019-11-12 DIAGNOSIS — Z29.9 ENCOUNTER FOR PROPHYLACTIC MEASURES, UNSPECIFIED: ICD-10-CM

## 2019-11-12 DIAGNOSIS — I25.10 ATHEROSCLEROTIC HEART DISEASE OF NATIVE CORONARY ARTERY WITHOUT ANGINA PECTORIS: ICD-10-CM

## 2019-11-12 DIAGNOSIS — I10 ESSENTIAL (PRIMARY) HYPERTENSION: ICD-10-CM

## 2019-11-12 DIAGNOSIS — I48.91 UNSPECIFIED ATRIAL FIBRILLATION: ICD-10-CM

## 2019-11-12 DIAGNOSIS — N40.0 BENIGN PROSTATIC HYPERPLASIA WITHOUT LOWER URINARY TRACT SYMPTOMS: ICD-10-CM

## 2019-11-12 DIAGNOSIS — Z02.9 ENCOUNTER FOR ADMINISTRATIVE EXAMINATIONS, UNSPECIFIED: ICD-10-CM

## 2019-11-12 DIAGNOSIS — R46.89 OTHER SYMPTOMS AND SIGNS INVOLVING APPEARANCE AND BEHAVIOR: ICD-10-CM

## 2019-11-12 DIAGNOSIS — R07.9 CHEST PAIN, UNSPECIFIED: ICD-10-CM

## 2019-11-12 PROBLEM — E78.49 OTHER HYPERLIPIDEMIA: Chronic | Status: ACTIVE | Noted: 2019-07-25

## 2019-11-12 LAB
ALBUMIN SERPL ELPH-MCNC: 4.3 G/DL — SIGNIFICANT CHANGE UP (ref 3.3–5)
ALP SERPL-CCNC: 84 U/L — SIGNIFICANT CHANGE UP (ref 40–120)
ALT FLD-CCNC: 8 U/L — SIGNIFICANT CHANGE UP (ref 4–41)
ANION GAP SERPL CALC-SCNC: 9 MMO/L — SIGNIFICANT CHANGE UP (ref 7–14)
APPEARANCE UR: CLEAR — SIGNIFICANT CHANGE UP
APTT BLD: 32.9 SEC — SIGNIFICANT CHANGE UP (ref 27.5–36.3)
AST SERPL-CCNC: 7 U/L — SIGNIFICANT CHANGE UP (ref 4–40)
BACTERIA # UR AUTO: SIGNIFICANT CHANGE UP
BASOPHILS # BLD AUTO: 0.05 K/UL — SIGNIFICANT CHANGE UP (ref 0–0.2)
BASOPHILS NFR BLD AUTO: 0.7 % — SIGNIFICANT CHANGE UP (ref 0–2)
BILIRUB SERPL-MCNC: 0.6 MG/DL — SIGNIFICANT CHANGE UP (ref 0.2–1.2)
BILIRUB UR-MCNC: NEGATIVE — SIGNIFICANT CHANGE UP
BLOOD UR QL VISUAL: NEGATIVE — SIGNIFICANT CHANGE UP
BUN SERPL-MCNC: 20 MG/DL — SIGNIFICANT CHANGE UP (ref 7–23)
CALCIUM SERPL-MCNC: 10 MG/DL — SIGNIFICANT CHANGE UP (ref 8.4–10.5)
CHLORIDE SERPL-SCNC: 108 MMOL/L — HIGH (ref 98–107)
CO2 SERPL-SCNC: 30 MMOL/L — SIGNIFICANT CHANGE UP (ref 22–31)
COLOR SPEC: SIGNIFICANT CHANGE UP
CREAT SERPL-MCNC: 1.11 MG/DL — SIGNIFICANT CHANGE UP (ref 0.5–1.3)
EOSINOPHIL # BLD AUTO: 0.12 K/UL — SIGNIFICANT CHANGE UP (ref 0–0.5)
EOSINOPHIL NFR BLD AUTO: 1.6 % — SIGNIFICANT CHANGE UP (ref 0–6)
GLUCOSE SERPL-MCNC: 97 MG/DL — SIGNIFICANT CHANGE UP (ref 70–99)
GLUCOSE UR-MCNC: NEGATIVE — SIGNIFICANT CHANGE UP
HCT VFR BLD CALC: 35.9 % — LOW (ref 39–50)
HGB BLD-MCNC: 11 G/DL — LOW (ref 13–17)
IMM GRANULOCYTES NFR BLD AUTO: 0.4 % — SIGNIFICANT CHANGE UP (ref 0–1.5)
INR BLD: 1.28 — HIGH (ref 0.88–1.17)
KETONES UR-MCNC: NEGATIVE — SIGNIFICANT CHANGE UP
LEUKOCYTE ESTERASE UR-ACNC: NEGATIVE — SIGNIFICANT CHANGE UP
LYMPHOCYTES # BLD AUTO: 0.89 K/UL — LOW (ref 1–3.3)
LYMPHOCYTES # BLD AUTO: 12.1 % — LOW (ref 13–44)
MCHC RBC-ENTMCNC: 29.4 PG — SIGNIFICANT CHANGE UP (ref 27–34)
MCHC RBC-ENTMCNC: 30.6 % — LOW (ref 32–36)
MCV RBC AUTO: 96 FL — SIGNIFICANT CHANGE UP (ref 80–100)
MONOCYTES # BLD AUTO: 0.69 K/UL — SIGNIFICANT CHANGE UP (ref 0–0.9)
MONOCYTES NFR BLD AUTO: 9.4 % — SIGNIFICANT CHANGE UP (ref 2–14)
NEUTROPHILS # BLD AUTO: 5.58 K/UL — SIGNIFICANT CHANGE UP (ref 1.8–7.4)
NEUTROPHILS NFR BLD AUTO: 75.8 % — SIGNIFICANT CHANGE UP (ref 43–77)
NITRITE UR-MCNC: NEGATIVE — SIGNIFICANT CHANGE UP
NRBC # FLD: 0 K/UL — SIGNIFICANT CHANGE UP (ref 0–0)
PH UR: 6.5 — SIGNIFICANT CHANGE UP (ref 5–8)
PLATELET # BLD AUTO: 203 K/UL — SIGNIFICANT CHANGE UP (ref 150–400)
PMV BLD: 10.7 FL — SIGNIFICANT CHANGE UP (ref 7–13)
POTASSIUM SERPL-MCNC: 5.6 MMOL/L — HIGH (ref 3.5–5.3)
POTASSIUM SERPL-SCNC: 5.6 MMOL/L — HIGH (ref 3.5–5.3)
PROT SERPL-MCNC: 8 G/DL — SIGNIFICANT CHANGE UP (ref 6–8.3)
PROT UR-MCNC: NEGATIVE — SIGNIFICANT CHANGE UP
PROTHROM AB SERPL-ACNC: 14.3 SEC — HIGH (ref 9.8–13.1)
RBC # BLD: 3.74 M/UL — LOW (ref 4.2–5.8)
RBC # FLD: 13.7 % — SIGNIFICANT CHANGE UP (ref 10.3–14.5)
SODIUM SERPL-SCNC: 147 MMOL/L — HIGH (ref 135–145)
SP GR SPEC: 1 — LOW (ref 1–1.04)
UROBILINOGEN FLD QL: NORMAL — SIGNIFICANT CHANGE UP
WBC # BLD: 7.36 K/UL — SIGNIFICANT CHANGE UP (ref 3.8–10.5)
WBC # FLD AUTO: 7.36 K/UL — SIGNIFICANT CHANGE UP (ref 3.8–10.5)

## 2019-11-12 PROCEDURE — 71046 X-RAY EXAM CHEST 2 VIEWS: CPT | Mod: 26

## 2019-11-12 PROCEDURE — 99223 1ST HOSP IP/OBS HIGH 75: CPT

## 2019-11-12 PROCEDURE — 70450 CT HEAD/BRAIN W/O DYE: CPT | Mod: 26

## 2019-11-12 RX ORDER — THIAMINE MONONITRATE (VIT B1) 100 MG
100 TABLET ORAL DAILY
Refills: 0 | Status: DISCONTINUED | OUTPATIENT
Start: 2019-11-12 | End: 2019-11-22

## 2019-11-12 RX ORDER — ENOXAPARIN SODIUM 100 MG/ML
40 INJECTION SUBCUTANEOUS DAILY
Refills: 0 | Status: DISCONTINUED | OUTPATIENT
Start: 2019-11-12 | End: 2019-11-22

## 2019-11-12 RX ORDER — DILTIAZEM HCL 120 MG
120 CAPSULE, EXT RELEASE 24 HR ORAL DAILY
Refills: 0 | Status: DISCONTINUED | OUTPATIENT
Start: 2019-11-12 | End: 2019-11-22

## 2019-11-12 RX ORDER — HALOPERIDOL DECANOATE 100 MG/ML
0.5 INJECTION INTRAMUSCULAR EVERY 6 HOURS
Refills: 0 | Status: DISCONTINUED | OUTPATIENT
Start: 2019-11-12 | End: 2019-11-12

## 2019-11-12 RX ORDER — DONEPEZIL HYDROCHLORIDE 10 MG/1
5 TABLET, FILM COATED ORAL AT BEDTIME
Refills: 0 | Status: DISCONTINUED | OUTPATIENT
Start: 2019-11-12 | End: 2019-11-22

## 2019-11-12 RX ORDER — HALOPERIDOL DECANOATE 100 MG/ML
0.5 INJECTION INTRAMUSCULAR EVERY 6 HOURS
Refills: 0 | Status: DISCONTINUED | OUTPATIENT
Start: 2019-11-12 | End: 2019-11-20

## 2019-11-12 RX ORDER — ATORVASTATIN CALCIUM 80 MG/1
10 TABLET, FILM COATED ORAL AT BEDTIME
Refills: 0 | Status: DISCONTINUED | OUTPATIENT
Start: 2019-11-12 | End: 2019-11-22

## 2019-11-12 RX ORDER — CLOPIDOGREL BISULFATE 75 MG/1
75 TABLET, FILM COATED ORAL DAILY
Refills: 0 | Status: DISCONTINUED | OUTPATIENT
Start: 2019-11-12 | End: 2019-11-15

## 2019-11-12 RX ORDER — TAMSULOSIN HYDROCHLORIDE 0.4 MG/1
0.4 CAPSULE ORAL AT BEDTIME
Refills: 0 | Status: DISCONTINUED | OUTPATIENT
Start: 2019-11-12 | End: 2019-11-22

## 2019-11-12 RX ORDER — QUETIAPINE FUMARATE 200 MG/1
12.5 TABLET, FILM COATED ORAL EVERY 6 HOURS
Refills: 0 | Status: DISCONTINUED | OUTPATIENT
Start: 2019-11-12 | End: 2019-11-20

## 2019-11-12 RX ADMIN — ATORVASTATIN CALCIUM 10 MILLIGRAM(S): 80 TABLET, FILM COATED ORAL at 23:25

## 2019-11-12 RX ADMIN — TAMSULOSIN HYDROCHLORIDE 0.4 MILLIGRAM(S): 0.4 CAPSULE ORAL at 23:25

## 2019-11-12 RX ADMIN — DONEPEZIL HYDROCHLORIDE 5 MILLIGRAM(S): 10 TABLET, FILM COATED ORAL at 23:25

## 2019-11-12 NOTE — H&P ADULT - PROBLEM SELECTOR PLAN 10
1.  Name of PCP: Dr. Sil Briseno  2.  PCP Contacted on Admission: [ ] Y    [X] N    3.  PCP contacted at Discharge: [ ] Y    [ ] N    [ ] N/A  4.  Post-Discharge Appointment Date and Location:  5.  Summary of Handoff given to PCP:

## 2019-11-12 NOTE — ED ADULT NURSE REASSESSMENT NOTE - NS ED NURSE REASSESS COMMENT FT1
received report from REBECCA Gutierres pt aox4 in no apparent distress AOX2/3 hx dementia no pending orders VSS will continue to monitor

## 2019-11-12 NOTE — ED ADULT NURSE NOTE - INTERVENTIONS DEFINITIONS
Non-slip footwear when patient is off stretcher/Physically safe environment: no spills, clutter or unnecessary equipment/Stretcher in lowest position, wheels locked, appropriate side rails in place
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
## 2019-11-12 NOTE — H&P ADULT - NSICDXPASTMEDICALHX_GEN_ALL_CORE_FT
PAST MEDICAL HISTORY:  Atrial fibrillation     CAD (coronary artery disease)     Dementia     History of BPH     Hypertension, unspecified type     Other hyperlipidemia

## 2019-11-12 NOTE — H&P ADULT - PROBLEM SELECTOR PLAN 3
- -likely 2/2 to delirium vs progression of dementia  -no identifiable infectious or metabolic cause. UA neg, CXR neg  -Seroquel 12.5 mg PO q6h as needed for behavioral health recs last admission. IV haldol 0.5 mg q6 prn if unable to tolerate PO'  -CM eval in AM re need for placement -likely 2/2 to delirium vs progression of dementia. CT head without acute pathology  -no identifiable infectious or metabolic cause. UA neg, CXR neg  -Seroquel 12.5 mg PO q6h as needed as per behavioral health recs last admission. IV haldol 0.5 mg q6 prn if unable to tolerate PO'  -CM eval in AM re need for placement

## 2019-11-12 NOTE — ED PROVIDER NOTE - ENMT, MLM
Airway patent, Nasal mucosa clear. Mouth with normal mucosa. Airway patent, Nasal mucosa clear. Mouth with normal mucosa. left rose marie-orbital ecchymosis

## 2019-11-12 NOTE — ED PROVIDER NOTE - SKIN, MLM
Skin normal color for race, warm, dry and intact. No evidence of rash. abrasion to right nose, no lacerations noted

## 2019-11-12 NOTE — H&P ADULT - NSHPPHYSICALEXAM_GEN_ALL_CORE
GENERAL APPEARANCE: Well developed, cachectic, alert and cooperative. NAD.   HEENT:  PERRL, EOMI. External auditory canals normal, hearing grossly intact. Periorbital ecchymosis and hematoma around right eye. Abrasions on nose  NECK: Neck supple, non-tender without lymphadenopathy, masses or thyromegaly.  CARDIAC: Normal S1 and S2. No S3, S4 or murmurs. Rhythm is irregularly irregular   LUNGS: Clear to auscultation and percussion without rales, rhonchi, wheezing or diminished breath sounds.  ABDOMEN: Positive bowel sounds. Soft, nondistended, nontender. No guarding or rebound.   MUSCULOSKELETAL: ROM intact spine and extremities. No joint erythema or tenderness.   BACK: normal posture, no spinal deformity, symmetry of spinal muscles, without tenderness, decreased range of motion.  EXTREMITIES: No significant deformity or joint abnormality. No edema. Peripheral pulses intact. No varicosities.  NEUROLOGICAL: CN II-XII intact. Strength and sensation symmetric and intact throughout.   SKIN: Abrasions on left arm  PSYCHIATRIC: AOx2 (disoriented to time).Normal affect and behavior.

## 2019-11-12 NOTE — H&P ADULT - PROBLEM SELECTOR PLAN 4
-worsening dementia with behavioral disturbance, no identifiable infectious or metabolic cause. UA neg, CXR neg  -QTc 440, treat agitation with Seroquel 12.5 mg PO q6h as needed as for behavioral health recs last admission. IV haldol .5 mg q6 prn if unable to tolerate PO. In am consider behavioral health c/s for recs regarding long term control of aggression   -SW and CM c/s  -c/w donepezil -QTc 440, treat agitation with Seroquel 12.5 mg PO q6h as needed for behavioral health recs last admission. IV haldol .5 mg q6 prn if unable to tolerate PO. In am consider behavioral health c/s for recs regarding long term control of aggression   -SW and CM c/s  -c/w donepezil -QTc 440, treat agitation with Seroquel 12.5 mg PO q6h as needed as per behavioral health recs last admission. IV haldol .5 mg q6 prn if unable to tolerate PO. In am consider behavioral health c/s for recs regarding long term control of aggression   -SW and CM c/s  -c/w donepezil

## 2019-11-12 NOTE — H&P ADULT - PROBLEM SELECTOR PLAN 9
DVT ppx: lovenox  PT eval DVT ppx: lovenox  PT eval  Will email med rec pharmacist to confirm meds in AM

## 2019-11-12 NOTE — H&P ADULT - PROBLEM SELECTOR PLAN 5
-continue with diltiazem for rate control  -off anticoagulation -continue with diltiazem for rate control  -off anticoagulation given high risk for falling
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
## 2019-11-12 NOTE — H&P ADULT - NSHPREVIEWOFSYSTEMS_GEN_ALL_CORE
CONSTITUTIONAL:  No weight loss, fever, chills, weakness or fatigue.  HEENT:  Eyes:  No visual loss, blurred vision, double vision or yellow sclerae. Ears, Nose, Throat:  No hearing loss, sneezing, congestion, runny nose or sore throat.  SKIN:  No rash or itching.  CARDIOVASCULAR: +chest pain No palpitations or edema.  RESPIRATORY:  No shortness of breath, cough or sputum.  GASTROINTESTINAL:  No anorexia, nausea, vomiting or diarrhea. No abdominal pain or blood.  GENITOURINARY:  Denies hematuria, dysuria.   NEUROLOGICAL:  No headache, dizziness, syncope, paralysis, ataxia, numbness or tingling in the extremities. No change in bowel or bladder control.  MUSCULOSKELETAL:  No muscle, back pain, joint pain or stiffness.  PSYCHIATRIC:  No history of depression or anxiety.

## 2019-11-12 NOTE — H&P ADULT - NSHPLABSRESULTS_GEN_ALL_CORE
( @ 16:10)                      11.0  7.36 )-----------( 203                 35.9    Neutrophils = 5.58 (75.8%)  Lymphocytes = 0.89 (12.1%)  Eosinophils = 0.12 (1.6%)  Basophils = 0.05 (0.7%)  Monocytes = 0.69 (9.4%)  Bands = --%        147<H>  |  108<H>  |  20  ----------------------------<  97  5.6<H>   |  30  |  1.11    Ca    10.0      2019 16:10    TPro  8.0  /  Alb  4.3  /  TBili  0.6  /  DBili  x   /  AST  7   /  ALT  8   /  AlkPhos  84      ( 2019 16:27 )   PT: 14.3 SEC;   INR: 1.28 ;       PTT:32.9 SEC        Urinalysis Basic - ( 2019 16:27 )    Color: LT. YELLOW / Appearance: CLEAR / S.003 / pH: 6.5  Gluc: NEGATIVE / Ketone: NEGATIVE  / Bili: NEGATIVE / Urobili: NORMAL   Blood: NEGATIVE / Protein: NEGATIVE / Nitrite: NEGATIVE   Leuk Esterase: NEGATIVE / RBC: x / WBC x   Sq Epi: x / Non Sq Epi: x / Bacteria: FEW    Labs reviewed.   EKG: Afib   Imaging reviewed     < from: Xray Chest 2 Views PA/Lat (19 @ 17:35) >        INTERPRETATION:  Clear lungs        < end of copied text >    < from: CT Head No Cont (19 @ 17:23) >    MISCELLANEOUS:  There is evidence of a partially visualized healed left   facial fracture. There is evidence of internal fixation of a right facial   fracture, incompletely seen.      IMPRESSION:    No mass effect, hemorrhage or evidence of acute intracranial pathology.        < end of copied text > ( @ 16:10)                      11.0  7.36 )-----------( 203                 35.9    Neutrophils = 5.58 (75.8%)  Lymphocytes = 0.89 (12.1%)  Eosinophils = 0.12 (1.6%)  Basophils = 0.05 (0.7%)  Monocytes = 0.69 (9.4%)  Bands = --%        147<H>  |  108<H>  |  20  ----------------------------<  97  5.6<H>   |  30  |  1.11    Ca    10.0      2019 16:10    TPro  8.0  /  Alb  4.3  /  TBili  0.6  /  DBili  x   /  AST  7   /  ALT  8   /  AlkPhos  84      ( 2019 16:27 )   PT: 14.3 SEC;   INR: 1.28 ;       PTT:32.9 SEC        Urinalysis Basic - ( 2019 16:27 )    Color: LT. YELLOW / Appearance: CLEAR / S.003 / pH: 6.5  Gluc: NEGATIVE / Ketone: NEGATIVE  / Bili: NEGATIVE / Urobili: NORMAL   Blood: NEGATIVE / Protein: NEGATIVE / Nitrite: NEGATIVE   Leuk Esterase: NEGATIVE / RBC: x / WBC x   Sq Epi: x / Non Sq Epi: x / Bacteria: FEW    Labs reviewed.   EKG: Afib, no acute ST changes. QTc 440  Imaging reviewed     < from: Xray Chest 2 Views PA/Lat (19 @ 17:35) >        INTERPRETATION:  Clear lungs        < end of copied text >    < from: CT Head No Cont (19 @ 17:23) >    MISCELLANEOUS:  There is evidence of a partially visualized healed left   facial fracture. There is evidence of internal fixation of a right facial   fracture, incompletely seen.      IMPRESSION:    No mass effect, hemorrhage or evidence of acute intracranial pathology.        < end of copied text >

## 2019-11-12 NOTE — H&P ADULT - NSHPSOCIALHISTORY_GEN_ALL_CORE
Reports social alcohol use. Denies tobacco or illicit drug use  Lives with daughter   Ambulates with a cane
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
## 2019-11-12 NOTE — H&P ADULT - PROBLEM SELECTOR PLAN 2
-Pt reports reproducible chest pain likely 2/2 -Pt reports reproducible chest pain likely 2/2 recent fall. Denies chest pain with exertion or activity. EKG without acute ST changes -Pt reports reproducible chest pain likely 2/2 recent fall. Denies chest pain with exertion or activity. EKG without acute ST changes.   -CXR without evidence of acute fracture   -Low suspicion for ACS, continue with plavix for h/o of CAD. Pt would be a poor candidate for cardiac intervention in the setting of worsening dementia
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
## 2019-11-12 NOTE — H&P ADULT - ASSESSMENT
86 yo male with a dementia, CAD on plavix, HTN, HLD, afib, BPH brought in by daughter s/p fall admitted for behavioral disturbance and assistance with placement

## 2019-11-13 LAB
ANION GAP SERPL CALC-SCNC: 13 MMO/L — SIGNIFICANT CHANGE UP (ref 7–14)
BUN SERPL-MCNC: 13 MG/DL — SIGNIFICANT CHANGE UP (ref 7–23)
CALCIUM SERPL-MCNC: 9.5 MG/DL — SIGNIFICANT CHANGE UP (ref 8.4–10.5)
CHLORIDE SERPL-SCNC: 104 MMOL/L — SIGNIFICANT CHANGE UP (ref 98–107)
CO2 SERPL-SCNC: 26 MMOL/L — SIGNIFICANT CHANGE UP (ref 22–31)
CREAT SERPL-MCNC: 0.94 MG/DL — SIGNIFICANT CHANGE UP (ref 0.5–1.3)
GLUCOSE SERPL-MCNC: 85 MG/DL — SIGNIFICANT CHANGE UP (ref 70–99)
HCT VFR BLD CALC: 33 % — LOW (ref 39–50)
HCT VFR BLD CALC: 33.4 % — LOW (ref 39–50)
HGB BLD-MCNC: 10.3 G/DL — LOW (ref 13–17)
HGB BLD-MCNC: 10.4 G/DL — LOW (ref 13–17)
MAGNESIUM SERPL-MCNC: 1.7 MG/DL — SIGNIFICANT CHANGE UP (ref 1.6–2.6)
MCHC RBC-ENTMCNC: 28.7 PG — SIGNIFICANT CHANGE UP (ref 27–34)
MCHC RBC-ENTMCNC: 29.1 PG — SIGNIFICANT CHANGE UP (ref 27–34)
MCHC RBC-ENTMCNC: 31.1 % — LOW (ref 32–36)
MCHC RBC-ENTMCNC: 31.2 % — LOW (ref 32–36)
MCV RBC AUTO: 92 FL — SIGNIFICANT CHANGE UP (ref 80–100)
MCV RBC AUTO: 93.2 FL — SIGNIFICANT CHANGE UP (ref 80–100)
NRBC # FLD: 0 K/UL — SIGNIFICANT CHANGE UP (ref 0–0)
NRBC # FLD: 0 K/UL — SIGNIFICANT CHANGE UP (ref 0–0)
PHOSPHATE SERPL-MCNC: 3.4 MG/DL — SIGNIFICANT CHANGE UP (ref 2.5–4.5)
PLATELET # BLD AUTO: 195 K/UL — SIGNIFICANT CHANGE UP (ref 150–400)
PLATELET # BLD AUTO: 210 K/UL — SIGNIFICANT CHANGE UP (ref 150–400)
PMV BLD: 10.8 FL — SIGNIFICANT CHANGE UP (ref 7–13)
PMV BLD: 11 FL — SIGNIFICANT CHANGE UP (ref 7–13)
POTASSIUM SERPL-MCNC: 4 MMOL/L — SIGNIFICANT CHANGE UP (ref 3.5–5.3)
POTASSIUM SERPL-SCNC: 4 MMOL/L — SIGNIFICANT CHANGE UP (ref 3.5–5.3)
RBC # BLD: 3.54 M/UL — LOW (ref 4.2–5.8)
RBC # BLD: 3.63 M/UL — LOW (ref 4.2–5.8)
RBC # FLD: 13.4 % — SIGNIFICANT CHANGE UP (ref 10.3–14.5)
RBC # FLD: 13.4 % — SIGNIFICANT CHANGE UP (ref 10.3–14.5)
SODIUM SERPL-SCNC: 143 MMOL/L — SIGNIFICANT CHANGE UP (ref 135–145)
WBC # BLD: 7.77 K/UL — SIGNIFICANT CHANGE UP (ref 3.8–10.5)
WBC # BLD: 8.01 K/UL — SIGNIFICANT CHANGE UP (ref 3.8–10.5)
WBC # FLD AUTO: 7.77 K/UL — SIGNIFICANT CHANGE UP (ref 3.8–10.5)
WBC # FLD AUTO: 8.01 K/UL — SIGNIFICANT CHANGE UP (ref 3.8–10.5)

## 2019-11-13 PROCEDURE — 90792 PSYCH DIAG EVAL W/MED SRVCS: CPT

## 2019-11-13 RX ORDER — RISPERIDONE 4 MG/1
0.25 TABLET ORAL
Refills: 0 | Status: DISCONTINUED | OUTPATIENT
Start: 2019-11-13 | End: 2019-11-22

## 2019-11-13 RX ORDER — LANOLIN ALCOHOL/MO/W.PET/CERES
3 CREAM (GRAM) TOPICAL
Refills: 0 | Status: DISCONTINUED | OUTPATIENT
Start: 2019-11-13 | End: 2019-11-22

## 2019-11-13 RX ADMIN — Medication 3 MILLIGRAM(S): at 19:03

## 2019-11-13 RX ADMIN — Medication 1 TABLET(S): at 12:44

## 2019-11-13 RX ADMIN — RISPERIDONE 0.25 MILLIGRAM(S): 4 TABLET ORAL at 17:32

## 2019-11-13 RX ADMIN — Medication 120 MILLIGRAM(S): at 05:36

## 2019-11-13 RX ADMIN — CLOPIDOGREL BISULFATE 75 MILLIGRAM(S): 75 TABLET, FILM COATED ORAL at 12:44

## 2019-11-13 RX ADMIN — ENOXAPARIN SODIUM 40 MILLIGRAM(S): 100 INJECTION SUBCUTANEOUS at 12:44

## 2019-11-13 RX ADMIN — Medication 100 MILLIGRAM(S): at 12:44
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
## 2019-11-13 NOTE — PROGRESS NOTE ADULT - SUBJECTIVE AND OBJECTIVE BOX
INTERVAL HPI/OVERNIGHT EVENTS: No new concerns.   Vital Signs Last 24 Hrs  T(C): 37 (2019 14:54), Max: 37.1 (2019 05:34)  T(F): 98.6 (2019 14:54), Max: 98.8 (2019 05:34)  HR: 79 (2019 14:54) (75 - 88)  BP: 124/67 (2019 14:54) (124/67 - 169/99)  BP(mean): --  RR: 17 (2019 14:54) (15 - 18)  SpO2: 100% (2019 14:54) (99% - 100%)  I&O's Summary    MEDICATIONS  (STANDING):  atorvastatin 10 milliGRAM(s) Oral at bedtime  clopidogrel Tablet 75 milliGRAM(s) Oral daily  diltiazem    milliGRAM(s) Oral daily  donepezil 5 milliGRAM(s) Oral at bedtime  enoxaparin Injectable 40 milliGRAM(s) SubCutaneous daily  melatonin 3 milliGRAM(s) Oral <User Schedule>  multivitamin 1 Tablet(s) Oral daily  tamsulosin 0.4 milliGRAM(s) Oral at bedtime  thiamine 100 milliGRAM(s) Oral daily    MEDICATIONS  (PRN):  haloperidol    Injectable 0.5 milliGRAM(s) IV Push every 6 hours PRN Agitation  QUEtiapine 12.5 milliGRAM(s) Oral every 6 hours PRN Agitation    LABS:                        10.3   7.77  )-----------( 195      ( 2019 05:35 )             33.0     -13    143  |  104  |  13  ----------------------------<  85  4.0   |  26  |  0.94    Ca    9.5      2019 05:35  Phos  3.4     11-13  Mg     1.7     11-13    TPro  8.0  /  Alb  4.3  /  TBili  0.6  /  DBili  x   /  AST  7   /  ALT  8   /  AlkPhos  84  11-12    PT/INR - ( 2019 16:27 )   PT: 14.3 SEC;   INR: 1.28          PTT - ( 2019 16:27 )  PTT:32.9 SEC  Urinalysis Basic - ( 2019 16:27 )    Color: LT. YELLOW / Appearance: CLEAR / S.003 / pH: 6.5  Gluc: NEGATIVE / Ketone: NEGATIVE  / Bili: NEGATIVE / Urobili: NORMAL   Blood: NEGATIVE / Protein: NEGATIVE / Nitrite: NEGATIVE   Leuk Esterase: NEGATIVE / RBC: x / WBC x   Sq Epi: x / Non Sq Epi: x / Bacteria: FEW      CAPILLARY BLOOD GLUCOSE            Urinalysis Basic - ( 2019 16:27 )    Color: LT. YELLOW / Appearance: CLEAR / S.003 / pH: 6.5  Gluc: NEGATIVE / Ketone: NEGATIVE  / Bili: NEGATIVE / Urobili: NORMAL   Blood: NEGATIVE / Protein: NEGATIVE / Nitrite: NEGATIVE   Leuk Esterase: NEGATIVE / RBC: x / WBC x   Sq Epi: x / Non Sq Epi: x / Bacteria: FEW      REVIEW OF SYSTEMS:  CONSTITUTIONAL: No fever, weight loss, or fatigue  EYES: No eye pain, visual disturbances, or discharge  ENMT:  No difficulty hearing, tinnitus, vertigo; No sinus or throat pain  NECK: No pain or stiffness  RESPIRATORY: No cough, wheezing, chills or hemoptysis; No shortness of breath  CARDIOVASCULAR: No chest pain, palpitations, dizziness, or leg swelling  GASTROINTESTINAL: No abdominal or epigastric pain. No nausea, vomiting, or hematemesis; No diarrhea or constipation. No melena or hematochezia.  GENITOURINARY: No dysuria, frequency, hematuria, or incontinence  NEUROLOGICAL: No headaches, memory loss, loss of strength, numbness, or tremors    Consultant(s) Notes Reviewed:  [x ] YES  [ ] NO    PHYSICAL EXAM:  GENERAL: NAD, well-groomed, well-developed,not in any distress ,  HEAD:  Atraumatic, Normocephalic  EYES: EOMI, PERRLA, conjunctiva and sclera clear, ecchymosis +  ENMT: No tonsillar erythema, exudates, or enlargement; Moist mucous membranes, Good dentition, No lesions  NECK: Supple, No JVD, Normal thyroid  NERVOUS SYSTEM:  Alert & Oriented X2, No focal deficit   CHEST/LUNG: Good air entry bilateral with no  rales, rhonchi, wheezing, or rubs  HEART: Regular rate and rhythm; No murmurs, rubs, or gallops  ABDOMEN: Soft, Nontender, Nondistended; Bowel sounds present  EXTREMITIES:  2+ Peripheral Pulses, No clubbing, cyanosis, or edema    Care Discussed with Consultants/Other Providers [ x] YES  [ ] NO INTERVAL HPI/OVERNIGHT EVENTS: No new concerns.   Vital Signs Last 24 Hrs  T(C): 37 (2019 14:54), Max: 37.1 (2019 05:34)  T(F): 98.6 (2019 14:54), Max: 98.8 (2019 05:34)  HR: 79 (2019 14:54) (75 - 88)  BP: 124/67 (2019 14:54) (124/67 - 169/99)  BP(mean): --  RR: 17 (2019 14:54) (15 - 18)  SpO2: 100% (2019 14:54) (99% - 100%)  I&O's Summary    MEDICATIONS  (STANDING):  atorvastatin 10 milliGRAM(s) Oral at bedtime  clopidogrel Tablet 75 milliGRAM(s) Oral daily  diltiazem    milliGRAM(s) Oral daily  donepezil 5 milliGRAM(s) Oral at bedtime  enoxaparin Injectable 40 milliGRAM(s) SubCutaneous daily  melatonin 3 milliGRAM(s) Oral <User Schedule>  multivitamin 1 Tablet(s) Oral daily  tamsulosin 0.4 milliGRAM(s) Oral at bedtime  thiamine 100 milliGRAM(s) Oral daily    MEDICATIONS  (PRN):  haloperidol    Injectable 0.5 milliGRAM(s) IV Push every 6 hours PRN Agitation  QUEtiapine 12.5 milliGRAM(s) Oral every 6 hours PRN Agitation    LABS:                        10.3   7.77  )-----------( 195      ( 2019 05:35 )             33.0     -13    143  |  104  |  13  ----------------------------<  85  4.0   |  26  |  0.94    Ca    9.5      2019 05:35  Phos  3.4     11-13  Mg     1.7     11-13    TPro  8.0  /  Alb  4.3  /  TBili  0.6  /  DBili  x   /  AST  7   /  ALT  8   /  AlkPhos  84  11-12    PT/INR - ( 2019 16:27 )   PT: 14.3 SEC;   INR: 1.28          PTT - ( 2019 16:27 )  PTT:32.9 SEC  Urinalysis Basic - ( 2019 16:27 )    Color: LT. YELLOW / Appearance: CLEAR / S.003 / pH: 6.5  Gluc: NEGATIVE / Ketone: NEGATIVE  / Bili: NEGATIVE / Urobili: NORMAL   Blood: NEGATIVE / Protein: NEGATIVE / Nitrite: NEGATIVE   Leuk Esterase: NEGATIVE / RBC: x / WBC x   Sq Epi: x / Non Sq Epi: x / Bacteria: FEW      CAPILLARY BLOOD GLUCOSE            Urinalysis Basic - ( 2019 16:27 )    Color: LT. YELLOW / Appearance: CLEAR / S.003 / pH: 6.5  Gluc: NEGATIVE / Ketone: NEGATIVE  / Bili: NEGATIVE / Urobili: NORMAL   Blood: NEGATIVE / Protein: NEGATIVE / Nitrite: NEGATIVE   Leuk Esterase: NEGATIVE / RBC: x / WBC x   Sq Epi: x / Non Sq Epi: x / Bacteria: FEW      REVIEW OF SYSTEMS:  CONSTITUTIONAL: No fever, weight loss, or fatigue  EYES: No eye pain, visual disturbances, or discharge  ENMT:  No difficulty hearing, tinnitus, vertigo; No sinus or throat pain  NECK: No pain or stiffness  RESPIRATORY: No cough, wheezing, chills or hemoptysis; No shortness of breath  CARDIOVASCULAR: No chest pain, palpitations, dizziness, or leg swelling  GASTROINTESTINAL: No abdominal or epigastric pain. No nausea, vomiting, or hematemesis; No diarrhea or constipation. No melena or hematochezia.  GENITOURINARY: No dysuria, frequency, hematuria, or incontinence  NEUROLOGICAL: No headaches, memory loss, loss of strength, numbness, or tremors    Consultant(s) Notes Reviewed:  [x ] YES  [ ] NO    PHYSICAL EXAM:  GENERAL: NAD, well-groomed, well-developed,not in any distress ,  HEAD:  Atraumatic, Normocephalic  EYES: EOMI, PERRLA, conjunctiva and sclera clear, ecchymosis +  ENMT: No tonsillar erythema, exudates, or enlargement; Moist mucous membranes, Good dentition, No lesions  NECK: Supple, No JVD, Normal thyroid  NERVOUS SYSTEM:  Alert & Oriented X2, No focal deficit   CHEST/LUNG: Good air entry bilateral with no  rales, rhonchi, wheezing, or rubs  HEART: Regular rate and rhythm; No murmurs, rubs, or gallops  ABDOMEN: Soft, Nontender, Nondistended; Bowel sounds present, left inguinal hernia   EXTREMITIES:  2+ Peripheral Pulses, No clubbing, cyanosis, or edema    Care Discussed with Consultants/Other Providers [ x] YES  [ ] NO
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
## 2019-11-13 NOTE — DISCHARGE NOTE PROVIDER - NSDCCPCAREPLAN_GEN_ALL_CORE_FT
PRINCIPAL DISCHARGE DIAGNOSIS  Diagnosis: Fall  Assessment and Plan of Treatment: You were admitted to the hospital after a fall. Please maintain a safe environment where you reside. Place night lights in the hallways and non-slip rugs/mats on hard surfaces. It is recommended that you move in a careful manner to prevent future events. Perform any exercises recommended by physical therapy and follow-up with your primary care provider.        SECONDARY DISCHARGE DIAGNOSES  Diagnosis: Dementia with aggressive behavior  Assessment and Plan of Treatment: Please continue your medications as prescribed and allow help with daily acitivities of living. Maintain a safe environment and make movements in a careful manner to prevent falls. Ensure that you are eating and drinking adequately and maintaining a healthy sleep cycle. If you are in need of assistance with medication adjustment you can follow-up with your outpatient provider or refer to the [facility]ry clinic by calling [phone]. PRINCIPAL DISCHARGE DIAGNOSIS  Diagnosis: Fall  Assessment and Plan of Treatment: You were admitted to the hospital after a fall. Please maintain a safe environment where you reside. Place night lights in the hallways and non-slip rugs/mats on hard surfaces. It is recommended that you move in a careful manner to prevent future events. Perform any exercises recommended by physical therapy and follow-up with your primary care provider.        SECONDARY DISCHARGE DIAGNOSES  Diagnosis: S/P inguinal hernia repair  Assessment and Plan of Treatment: You had your inguinal hernia repaired by surgery on 11/18/1. No heavy lifting for 1 month. Please continue to keep ice on the area. Please follow up with Dr. Nuñez in the office in 7-10 days.    Diagnosis: Dementia with aggressive behavior  Assessment and Plan of Treatment: Please continue your medications as prescribed and allow help with daily acitivities of living. Maintain a safe environment and make movements in a careful manner to prevent falls. Ensure that you are eating and drinking adequately and maintaining a healthy sleep cycle. If you are in need of assistance with medication adjustment you can follow-up with your outpatient provider or refer to the [facility] Psychiatry clinic by calling [phone]. PRINCIPAL DISCHARGE DIAGNOSIS  Diagnosis: Fall  Assessment and Plan of Treatment: You were admitted to the hospital after a fall. Please maintain a safe environment where you reside. Place night lights in the hallways and non-slip rugs/mats on hard surfaces. It is recommended that you move in a careful manner to prevent future events. Perform any exercises recommended by physical therapy and follow-up with your primary care provider. participate in PT at Rehab facility as tolerated to gain strength and motor function.        SECONDARY DISCHARGE DIAGNOSES  Diagnosis: S/P inguinal hernia repair  Assessment and Plan of Treatment: You had your inguinal hernia repaired by surgery on 11/18/1. No heavy lifting for 1 month. Please continue to keep ice on the area. Please follow up with Dr. Nuñez in the office on 11/26/19 @ 12PM ([address], [place], Room 309)    Diagnosis: Dementia with aggressive behavior  Assessment and Plan of Treatment: Please continue your medications as prescribed and allow help with daily acitivities of living. Maintain a safe environment and make movements in a careful manner to prevent falls. Ensure that you are eating and drinking adequately and maintaining a healthy sleep cycle. If you are in need of assistance with medication adjustment you can follow-up with your outpatient provider or refer to the [facility] Psychiatry clinic by calling [phone].

## 2019-11-13 NOTE — DISCHARGE NOTE PROVIDER - NSFOLLOWUPCLINICS_GEN_ALL_ED_FT
[facility]  Behavioral Health  [address] [place] [address]  Phone: [phone]  Fax:     [facility]  Psychiatry  [address] [place] [address]  Phone: [phone]  Fax:   Follow Up Time:

## 2019-11-13 NOTE — DISCHARGE NOTE PROVIDER - NSDCMRMEDTOKEN_GEN_ALL_CORE_FT
atorvastatin 10 mg oral tablet: 1 tab(s) orally once a day  dilTIAZem 120 mg/24 hours oral capsule, extended release: 1 cap(s) orally once a day  donepezil 5 mg oral tablet: 1 tab(s) orally once a day (at bedtime)  memantine 5 mg oral tablet: 1 tab(s) orally once a day  Multiple Vitamins oral tablet: 1 tab(s) orally once a day  Plavix 75 mg oral tablet: 1 tab(s) orally once a day  tamsulosin 0.4 mg oral capsule: 1 cap(s) orally once a day  thiamine 100 mg oral tablet: 1 tab(s) orally once a day atorvastatin 10 mg oral tablet: 1 tab(s) orally once a day (at bedtime)  clopidogrel 75 mg oral tablet: 1 tab(s) orally once a day  dilTIAZem 120 mg/24 hours oral capsule, extended release: 1 cap(s) orally once a day  donepezil 5 mg oral tablet: 1 tab(s) orally once a day (at bedtime)  melatonin 3 mg oral tablet: 1 tab(s) orally   Multiple Vitamins oral tablet: 1 tab(s) orally once a day  risperiDONE 0.25 mg oral tablet: 1 tab(s) orally 2 times a day  tamsulosin 0.4 mg oral capsule: 1 cap(s) orally once a day (at bedtime)  thiamine 100 mg oral tablet: 1 tab(s) orally once a day

## 2019-11-13 NOTE — BEHAVIORAL HEALTH ASSESSMENT NOTE - DETAILS
patient denies SI, SA in the past daughter brought patient in for agitation and aggression gun in home bruising, scratch by right eye

## 2019-11-13 NOTE — BEHAVIORAL HEALTH ASSESSMENT NOTE - SUICIDE RISK FACTORS
Impulsivity/Access to lethal methods (pills, firearm, etc.: Ask specifically about presence or absence of a firearm in the home or ease of accessing Impulsivity

## 2019-11-13 NOTE — DISCHARGE NOTE PROVIDER - NSDCFUADDAPPT_GEN_ALL_CORE_FT
Please follow up with Dr. Nuñez (surgeon) in the office in 7-10 days.  Please follow up with your PCP in 1 week.  If you are in need of assistance with medication adjustment you can follow-up with your outpatient provider or refer to the [facility] Psychiatry clinic by calling [phone]. Please follow up with Dr. Nuñez (surgeon) in the office within one week  Please follow up with your PCP in 1 week.  If you are in need of assistance with medication adjustment you can follow-up with your outpatient provider or refer to the [facility] Ger[facility] Psychiatry clinic by calling [phone].

## 2019-11-13 NOTE — BEHAVIORAL HEALTH ASSESSMENT NOTE - NSBHCONSULTFOLLOWDETAILS_PSY_A_CORE FT
clarify gun access with collateral  SW involved for reports of aggression at home  SAfe dispo planning SW involved for reports of aggression at home  SAfe dispo planning
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
## 2019-11-13 NOTE — BEHAVIORAL HEALTH ASSESSMENT NOTE - CASE SUMMARY
met with the patient with ms tigist np, impression and plan discussed and agreed upon. Patient is pleasant, confused, disoriented. No a.vh or evident paranoia during interview. Collateral concerning for increased paranoia and agitation at home. Agree with starting risperidone 0.25mg bid po for paranoia. Daughter aware of black box warning and is in agreement with use

## 2019-11-13 NOTE — BEHAVIORAL HEALTH ASSESSMENT NOTE - RISK ASSESSMENT
Low Acute Suicide Risk Protective factors include no suicide attempts, no global insomnia, no substance abuse, lives with family, willingness to seek help, no suicidal ideation or homicidal ideation, hopefulness for future.   Risk: gun access?, recent aggression, dementia     Patient denies SI , has no hx of SA - overall low risk   SW involvement - discuss safe dispo planning Protective factors include no suicide attempts, no global insomnia, no substance abuse, lives with family, willingness to seek help, no suicidal ideation or homicidal ideation, hopefulness for future, no access to weapons  Risk:, recent aggression, dementia     Patient denies SI , has no hx of SA - overall low risk   SW involvement - discuss safe dispo planning

## 2019-11-13 NOTE — DISCHARGE NOTE PROVIDER - CARE PROVIDER_API CALL
Dr. Finn  Primary care provider  Phone: (   )    -  Fax: (   )    -  Follow Up Time: 2 weeks Dr. Finn  Primary care provider  Phone: (   )    -  Fax: (   )    -  Follow Up Time: 2 weeks    George Nuñez)  ColonRectal Surgery; Surgery  [address] [facility] [address], [address]  [place] [address]  Phone: [phone]  Fax: [phone]  Follow Up Time:
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
## 2019-11-13 NOTE — BEHAVIORAL HEALTH ASSESSMENT NOTE - NSBHCHARTREVIEWVS_PSY_A_CORE FT
Vital Signs Last 24 Hrs  T(C): 37.1 (13 Nov 2019 05:34), Max: 37.1 (13 Nov 2019 05:34)  T(F): 98.8 (13 Nov 2019 05:34), Max: 98.8 (13 Nov 2019 05:34)  HR: 78 (13 Nov 2019 05:34) (75 - 95)  BP: 144/88 (13 Nov 2019 05:34) (144/88 - 169/99)  BP(mean): --  RR: 16 (13 Nov 2019 05:34) (16 - 18)  SpO2: 99% (13 Nov 2019 05:34) (99% - 100%)

## 2019-11-13 NOTE — BEHAVIORAL HEALTH ASSESSMENT NOTE - COMMENTS ON VIOLENCE RISK/PROTECTIVE FACTORS:
patient without substance abuse does socially drink, will clarify weapons in the home with collateral

## 2019-11-13 NOTE — BEHAVIORAL HEALTH ASSESSMENT NOTE - NSBHCHARTREVIEWLAB_PSY_A_CORE FT
10.3   7.77  )-----------( 195      ( 13 Nov 2019 05:35 )             33.0   11-13    143  |  104  |  13  ----------------------------<  85  4.0   |  26  |  0.94    Ca    9.5      13 Nov 2019 05:35  Phos  3.4     11-13  Mg     1.7     11-13    TPro  8.0  /  Alb  4.3  /  TBili  0.6  /  DBili  x   /  AST  7   /  ALT  8   /  AlkPhos  84  11-12

## 2019-11-13 NOTE — DISCHARGE NOTE PROVIDER - PROVIDER TOKENS
FREE:[LAST:[Briseno],FIRST:[],PHONE:[(   )    -],FAX:[(   )    -],ADDRESS:[Primary care provider],FOLLOWUP:[2 weeks]] FREE:[LAST:[Briseno],FIRST:[],PHONE:[(   )    -],FAX:[(   )    -],ADDRESS:[Primary care provider],FOLLOWUP:[2 weeks]],PROVIDER:[TOKEN:[[id number]]]

## 2019-11-14 DIAGNOSIS — F03.91 UNSPECIFIED DEMENTIA WITH BEHAVIORAL DISTURBANCE: ICD-10-CM

## 2019-11-14 LAB
BACTERIA UR CULT: SIGNIFICANT CHANGE UP
HCT VFR BLD CALC: 35.2 % — LOW (ref 39–50)
HGB BLD-MCNC: 10.9 G/DL — LOW (ref 13–17)
MCHC RBC-ENTMCNC: 28.8 PG — SIGNIFICANT CHANGE UP (ref 27–34)
MCHC RBC-ENTMCNC: 31 % — LOW (ref 32–36)
MCV RBC AUTO: 93.1 FL — SIGNIFICANT CHANGE UP (ref 80–100)
NRBC # FLD: 0 K/UL — SIGNIFICANT CHANGE UP (ref 0–0)
PLATELET # BLD AUTO: 215 K/UL — SIGNIFICANT CHANGE UP (ref 150–400)
PMV BLD: 11.2 FL — SIGNIFICANT CHANGE UP (ref 7–13)
RBC # BLD: 3.78 M/UL — LOW (ref 4.2–5.8)
RBC # FLD: 13.4 % — SIGNIFICANT CHANGE UP (ref 10.3–14.5)
SPECIMEN SOURCE: SIGNIFICANT CHANGE UP
WBC # BLD: 7.96 K/UL — SIGNIFICANT CHANGE UP (ref 3.8–10.5)
WBC # FLD AUTO: 7.96 K/UL — SIGNIFICANT CHANGE UP (ref 3.8–10.5)

## 2019-11-14 PROCEDURE — 99233 SBSQ HOSP IP/OBS HIGH 50: CPT

## 2019-11-14 RX ADMIN — DONEPEZIL HYDROCHLORIDE 5 MILLIGRAM(S): 10 TABLET, FILM COATED ORAL at 02:12

## 2019-11-14 RX ADMIN — Medication 3 MILLIGRAM(S): at 21:20

## 2019-11-14 RX ADMIN — CLOPIDOGREL BISULFATE 75 MILLIGRAM(S): 75 TABLET, FILM COATED ORAL at 15:02

## 2019-11-14 RX ADMIN — RISPERIDONE 0.25 MILLIGRAM(S): 4 TABLET ORAL at 17:22

## 2019-11-14 RX ADMIN — Medication 1 TABLET(S): at 15:02

## 2019-11-14 RX ADMIN — RISPERIDONE 0.25 MILLIGRAM(S): 4 TABLET ORAL at 06:46

## 2019-11-14 RX ADMIN — TAMSULOSIN HYDROCHLORIDE 0.4 MILLIGRAM(S): 0.4 CAPSULE ORAL at 02:12

## 2019-11-14 RX ADMIN — TAMSULOSIN HYDROCHLORIDE 0.4 MILLIGRAM(S): 0.4 CAPSULE ORAL at 21:20

## 2019-11-14 RX ADMIN — DONEPEZIL HYDROCHLORIDE 5 MILLIGRAM(S): 10 TABLET, FILM COATED ORAL at 21:20

## 2019-11-14 RX ADMIN — ATORVASTATIN CALCIUM 10 MILLIGRAM(S): 80 TABLET, FILM COATED ORAL at 02:12

## 2019-11-14 RX ADMIN — Medication 100 MILLIGRAM(S): at 15:02

## 2019-11-14 RX ADMIN — ENOXAPARIN SODIUM 40 MILLIGRAM(S): 100 INJECTION SUBCUTANEOUS at 13:02

## 2019-11-14 RX ADMIN — Medication 120 MILLIGRAM(S): at 06:44

## 2019-11-14 RX ADMIN — ATORVASTATIN CALCIUM 10 MILLIGRAM(S): 80 TABLET, FILM COATED ORAL at 21:19

## 2019-11-14 NOTE — PROGRESS NOTE ADULT - SUBJECTIVE AND OBJECTIVE BOX
General Surgery Progress Note     S: Pt seen and examined at bedside during AM rounds  - Pt states his hernia is "gone" now, and does not want to be examined or want surgery  - denies f/c, n/v, CP, abd pain, SOB, lightheadedness.     O:    ***PHYSICAL EXAM***    Gen: NAD, laying in bed  HEENT: NC/AT  RESP: nonlabored breathing  Abdominal: unable to be examined    MEDICATIONS  (STANDING):  atorvastatin 10 milliGRAM(s) Oral at bedtime  clopidogrel Tablet 75 milliGRAM(s) Oral daily  diltiazem    milliGRAM(s) Oral daily  donepezil 5 milliGRAM(s) Oral at bedtime  enoxaparin Injectable 40 milliGRAM(s) SubCutaneous daily  melatonin 3 milliGRAM(s) Oral <User Schedule>  multivitamin 1 Tablet(s) Oral daily  risperiDONE   Tablet 0.25 milliGRAM(s) Oral two times a day  tamsulosin 0.4 milliGRAM(s) Oral at bedtime  thiamine 100 milliGRAM(s) Oral daily    MEDICATIONS  (PRN):  haloperidol    Injectable 0.5 milliGRAM(s) IV Push every 6 hours PRN Agitation  QUEtiapine 12.5 milliGRAM(s) Oral every 6 hours PRN Agitation          Vital Signs Last 24 Hrs  T(C): 36.3 (14 Nov 2019 06:39), Max: 37 (13 Nov 2019 14:54)  T(F): 97.4 (14 Nov 2019 06:39), Max: 98.6 (13 Nov 2019 14:54)  HR: 76 (14 Nov 2019 06:39) (70 - 88)  BP: 147/76 (14 Nov 2019 06:39) (124/67 - 147/76)  BP(mean): --  RR: 17 (14 Nov 2019 06:39) (15 - 17)  SpO2: 100% (14 Nov 2019 06:39) (99% - 100%)    11-13-19 @ 07:01  -  11-14-19 @ 07:00  --------------------------------------------------------  IN: 0 mL / OUT: 300 mL / NET: -300 mL            LABS:                        10.9   7.96  )-----------( 215      ( 14 Nov 2019 06:45 )             35.2     11-13    143  |  104  |  13  ----------------------------<  85  4.0   |  26  |  0.94    Ca    9.5      13 Nov 2019 05:35  Phos  3.4     11-13  Mg     1.7     11-13    TPro  8.0  /  Alb  4.3  /  TBili  0.6  /  DBili  x   /  AST  7   /  ALT  8   /  AlkPhos  84  11-12

## 2019-11-14 NOTE — PROGRESS NOTE ADULT - ASSESSMENT
87M with large reducible inguinal hernia    - pt seen and examined with Dr. Nuñez  - no emergent surgical intervention rec at this time  - care per primary team  - outpt f/u for elective repair with Dr. George Nuñez: [phone] 87M with large reducible inguinal hernia    - pt seen and examined with Dr. Nuñez  - medical clearance and optimization  - plan for left inguinal hernia repair with mesh on Monday 11/18/19  - care per primary team

## 2019-11-14 NOTE — PROGRESS NOTE ADULT - SUBJECTIVE AND OBJECTIVE BOX
INTERVAL HPI/OVERNIGHT EVENTS: No new concerns .   Vital Signs Last 24 Hrs  T(C): 36.3 (2019 06:39), Max: 37 (2019 14:54)  T(F): 97.4 (2019 06:39), Max: 98.6 (2019 14:54)  HR: 76 (2019 06:39) (70 - 88)  BP: 147/76 (2019 06:39) (124/67 - 147/76)  BP(mean): --  RR: 17 (2019 06:39) (15 - 17)  SpO2: 100% (2019 06:39) (99% - 100%)  I&O's Summary    2019 07:01  -  2019 07:00  --------------------------------------------------------  IN: 0 mL / OUT: 300 mL / NET: -300 mL      MEDICATIONS  (STANDING):  atorvastatin 10 milliGRAM(s) Oral at bedtime  clopidogrel Tablet 75 milliGRAM(s) Oral daily  diltiazem    milliGRAM(s) Oral daily  donepezil 5 milliGRAM(s) Oral at bedtime  enoxaparin Injectable 40 milliGRAM(s) SubCutaneous daily  melatonin 3 milliGRAM(s) Oral <User Schedule>  multivitamin 1 Tablet(s) Oral daily  risperiDONE   Tablet 0.25 milliGRAM(s) Oral two times a day  tamsulosin 0.4 milliGRAM(s) Oral at bedtime  thiamine 100 milliGRAM(s) Oral daily    MEDICATIONS  (PRN):  haloperidol    Injectable 0.5 milliGRAM(s) IV Push every 6 hours PRN Agitation  QUEtiapine 12.5 milliGRAM(s) Oral every 6 hours PRN Agitation    LABS:                        10.9   7.96  )-----------( 215      ( 2019 06:45 )             35.2         143  |  104  |  13  ----------------------------<  85  4.0   |  26  |  0.94    Ca    9.5      2019 05:35  Phos  3.4     -  Mg     1.7     -    TPro  8.0  /  Alb  4.3  /  TBili  0.6  /  DBili  x   /  AST  7   /  ALT  8   /  AlkPhos  84      PT/INR - ( 2019 16:27 )   PT: 14.3 SEC;   INR: 1.28          PTT - ( 2019 16:27 )  PTT:32.9 SEC  Urinalysis Basic - ( 2019 16:27 )    Color: LT. YELLOW / Appearance: CLEAR / S.003 / pH: 6.5  Gluc: NEGATIVE / Ketone: NEGATIVE  / Bili: NEGATIVE / Urobili: NORMAL   Blood: NEGATIVE / Protein: NEGATIVE / Nitrite: NEGATIVE   Leuk Esterase: NEGATIVE / RBC: x / WBC x   Sq Epi: x / Non Sq Epi: x / Bacteria: FEW      CAPILLARY BLOOD GLUCOSE            Urinalysis Basic - ( 2019 16:27 )    Color: LT. YELLOW / Appearance: CLEAR / S.003 / pH: 6.5  Gluc: NEGATIVE / Ketone: NEGATIVE  / Bili: NEGATIVE / Urobili: NORMAL   Blood: NEGATIVE / Protein: NEGATIVE / Nitrite: NEGATIVE   Leuk Esterase: NEGATIVE / RBC: x / WBC x   Sq Epi: x / Non Sq Epi: x / Bacteria: FEW          Consultant(s) Notes Reviewed:  [x ] YES  [ ] NO    PHYSICAL EXAM:  GENERAL: NAD, well-groomed, well-developed, not in any distress ,  HEAD:  Atraumatic, Normocephalic  EYES: EOMI, PERRLA, conjunctiva and sclera clear, bruise under right eye  ENMT: No tonsillar erythema, exudates, or enlargement; Moist mucous membranes, Good dentition, No lesions  NECK: Supple, No JVD, Normal thyroid  NERVOUS SYSTEM:  Alert & Oriented X2, No focal deficit   CHEST/LUNG: Good air entry bilateral with no  rales, rhonchi, wheezing, or rubs  HEART: Regular rate and rhythm; No murmurs, rubs, or gallops  ABDOMEN: Soft, Nontender, Nondistended; Bowel sounds present  EXTREMITIES:  2+ Peripheral Pulses, No clubbing, cyanosis, or edema    Care Discussed with Consultants/Other Providers [ x] YES  [ ] NO

## 2019-11-14 NOTE — PROGRESS NOTE BEHAVIORAL HEALTH - NSBHADMITCOUNSEL_PSY_A_CORE
importance of adherence to chosen treatment/risk factor reduction/client/family/caregiver education/instructions for management, treatment and follow up/risks and benefits of treatment options

## 2019-11-14 NOTE — PROGRESS NOTE ADULT - ASSESSMENT
87M w/ reducible Left inguinal hernia, asymptomatic    - Pt does not wish to be examined or operated on. However pt also has a hx of dementia and wanted the surgery yesterday.  - will discuss w/ Dr. Elam and Dr. Joaquin Marshall PGY-2  A Surgery  [id number]

## 2019-11-14 NOTE — PROGRESS NOTE BEHAVIORAL HEALTH - RISK ASSESSMENT
Protective factors include no suicide attempts, no global insomnia, no substance abuse, lives with family, willingness to seek help, no suicidal ideation or homicidal ideation, hopefulness for future, no access to weapons  Risk:, recent aggression, dementia     Patient denies SI , has no hx of SA - overall low risk   SW involvement - discuss safe dispo planning
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
## 2019-11-14 NOTE — PROGRESS NOTE BEHAVIORAL HEALTH - NSBHFUPINTERVALCCFT_PSY_A_CORE
I feel like $100 dollars. No PRNs since last encounter, no nurse concerns, eating and sleeping well.

## 2019-11-14 NOTE — PROGRESS NOTE BEHAVIORAL HEALTH - NSBHFUPINTERVALHXFT_PSY_A_CORE
Patient is seen at bedside for follow up of agitation management. Patient is A&O x 1-2, he knows he is in a hospital but does not remember the name and he says he is 86 y.o. when he is 87 y.o. He says he has been feeling good and happy "I feel like $100 dollars." Patient said he slept well and has been enjoying the food. He denies any vh or ah. He says he feels safe in the hospital and no one has been bothering him. No si or hi. He says he lives with his daughter for the past 3 years. He says they get along well and don't argue. When asked about the bruise on his eye he says it is just a scratch and does not hurt. Patient is seen at bedside for follow up of agitation management. Patient is A&O x 1-2, he knows he is in a hospital but does not remember the name and he says he is 86 y.o. when he is 87 y.o. He says he has been feeling good and happy "I feel like $100 dollars." Patient said he slept well and has been enjoying the food. He denies any vh or ah. He says he feels safe in the hospital and no one has been bothering him. No si or hi. He says he lives with his daughter for the past 3 years. He says they get along well and don't argue. When asked about the bruise on his eye he says it is just a scratch and does not hurt.   With MD, he mentions being upset because he lost his shoes and was trying to go to the [employer] to get them. 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   Mild cogwheeling on exam.
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
## 2019-11-14 NOTE — CHART NOTE - NSCHARTNOTEFT_GEN_A_CORE
Surgery at bedside to evaluate patient for reducible Left inguinal hernia.  Willing to do surgery on patient while inpatient, although not urgent procedure.  Discussed with patient's daughter, who states she is HCP, and that she would be interested in surgical intervention as it affects patient's daily life.  Surgery made aware that family is interested to hear surgical options. Will possibly schedule for Monday 11/18.  Will obtain medical/cards clearance.  Will continue to monitor closely.  Will F/U Surgery. Discussed with Dr. Gatica.

## 2019-11-14 NOTE — PROGRESS NOTE ADULT - SUBJECTIVE AND OBJECTIVE BOX
INTERVAL HPI/OVERNIGHT EVENTS: Pt seen and examined. Pt states that he gets discomfort from his hernia with palpation.     MEDICATIONS  (STANDING):  atorvastatin 10 milliGRAM(s) Oral at bedtime  clopidogrel Tablet 75 milliGRAM(s) Oral daily  diltiazem    milliGRAM(s) Oral daily  donepezil 5 milliGRAM(s) Oral at bedtime  enoxaparin Injectable 40 milliGRAM(s) SubCutaneous daily  melatonin 3 milliGRAM(s) Oral <User Schedule>  multivitamin 1 Tablet(s) Oral daily  risperiDONE   Tablet 0.25 milliGRAM(s) Oral two times a day  tamsulosin 0.4 milliGRAM(s) Oral at bedtime  thiamine 100 milliGRAM(s) Oral daily    MEDICATIONS  (PRN):  haloperidol    Injectable 0.5 milliGRAM(s) IV Push every 6 hours PRN Agitation  QUEtiapine 12.5 milliGRAM(s) Oral every 6 hours PRN Agitation      Vital Signs Last 24 Hrs  T(C): 36.3 (2019 06:39), Max: 37 (2019 14:54)  T(F): 97.4 (2019 06:39), Max: 98.6 (2019 14:54)  HR: 76 (2019 06:39) (70 - 88)  BP: 147/76 (2019 06:39) (124/67 - 147/76)  BP(mean): --  RR: 17 (2019 06:39) (15 - 17)  SpO2: 100% (2019 06:39) (99% - 100%)    PHYSICAL EXAM:      Constitutional: NAD    Gastrointestinal: large left reducible inguinal hernia              I&O's Detail    2019 07:01  -  2019 07:00  --------------------------------------------------------  IN:  Total IN: 0 mL    OUT:    Voided: 300 mL  Total OUT: 300 mL    Total NET: -300 mL          LABS:                        10.9   7.96  )-----------( 215      ( 2019 06:45 )             35.2     11-13    143  |  104  |  13  ----------------------------<  85  4.0   |  26  |  0.94    Ca    9.5      2019 05:35  Phos  3.4       Mg     1.7         TPro  8.0  /  Alb  4.3  /  TBili  0.6  /  DBili  x   /  AST  7   /  ALT  8   /  AlkPhos  84      PT/INR - ( 2019 16:27 )   PT: 14.3 SEC;   INR: 1.28          PTT - ( 2019 16:27 )  PTT:32.9 SEC  Urinalysis Basic - ( 2019 16:27 )    Color: LT. YELLOW / Appearance: CLEAR / S.003 / pH: 6.5  Gluc: NEGATIVE / Ketone: NEGATIVE  / Bili: NEGATIVE / Urobili: NORMAL   Blood: NEGATIVE / Protein: NEGATIVE / Nitrite: NEGATIVE   Leuk Esterase: NEGATIVE / RBC: x / WBC x   Sq Epi: x / Non Sq Epi: x / Bacteria: FEW        RADIOLOGY & ADDITIONAL STUDIES:

## 2019-11-14 NOTE — PROGRESS NOTE BEHAVIORAL HEALTH - NSBHCHARTREVIEWLAB_PSY_A_CORE FT
11-13    143  |  104  |  13  ----------------------------<  85  4.0   |  26  |  0.94    Ca    9.5      13 Nov 2019 05:35  Phos  3.4     11-13  Mg     1.7     11-13    TPro  8.0  /  Alb  4.3  /  TBili  0.6  /  DBili  x   /  AST  7   /  ALT  8   /  AlkPhos  84  11-12                        10.9   7.96  )-----------( 215      ( 14 Nov 2019 06:45 )             35.2

## 2019-11-14 NOTE — PROGRESS NOTE BEHAVIORAL HEALTH - NSBHCHARTREVIEWVS_PSY_A_CORE FT
Vital Signs Last 24 Hrs  T(C): 36.3 (14 Nov 2019 06:39), Max: 37 (13 Nov 2019 14:54)  T(F): 97.4 (14 Nov 2019 06:39), Max: 98.6 (13 Nov 2019 14:54)  HR: 76 (14 Nov 2019 06:39) (70 - 88)  BP: 147/76 (14 Nov 2019 06:39) (124/67 - 147/76)  BP(mean): --  RR: 17 (14 Nov 2019 06:39) (15 - 17)  SpO2: 100% (14 Nov 2019 06:39) (99% - 100%)

## 2019-11-15 LAB
ANION GAP SERPL CALC-SCNC: 11 MMO/L — SIGNIFICANT CHANGE UP (ref 7–14)
BASOPHILS # BLD AUTO: 0.04 K/UL — SIGNIFICANT CHANGE UP (ref 0–0.2)
BASOPHILS NFR BLD AUTO: 0.6 % — SIGNIFICANT CHANGE UP (ref 0–2)
BUN SERPL-MCNC: 17 MG/DL — SIGNIFICANT CHANGE UP (ref 7–23)
CALCIUM SERPL-MCNC: 9.6 MG/DL — SIGNIFICANT CHANGE UP (ref 8.4–10.5)
CHLORIDE SERPL-SCNC: 103 MMOL/L — SIGNIFICANT CHANGE UP (ref 98–107)
CO2 SERPL-SCNC: 27 MMOL/L — SIGNIFICANT CHANGE UP (ref 22–31)
CREAT SERPL-MCNC: 1.11 MG/DL — SIGNIFICANT CHANGE UP (ref 0.5–1.3)
EOSINOPHIL # BLD AUTO: 0.15 K/UL — SIGNIFICANT CHANGE UP (ref 0–0.5)
EOSINOPHIL NFR BLD AUTO: 2.3 % — SIGNIFICANT CHANGE UP (ref 0–6)
FOLATE SERPL-MCNC: > 20 NG/ML — HIGH (ref 4.7–20)
GLUCOSE SERPL-MCNC: 90 MG/DL — SIGNIFICANT CHANGE UP (ref 70–99)
HCT VFR BLD CALC: 32.3 % — LOW (ref 39–50)
HGB BLD-MCNC: 10 G/DL — LOW (ref 13–17)
IMM GRANULOCYTES NFR BLD AUTO: 0.3 % — SIGNIFICANT CHANGE UP (ref 0–1.5)
LYMPHOCYTES # BLD AUTO: 0.98 K/UL — LOW (ref 1–3.3)
LYMPHOCYTES # BLD AUTO: 15.1 % — SIGNIFICANT CHANGE UP (ref 13–44)
MAGNESIUM SERPL-MCNC: 1.8 MG/DL — SIGNIFICANT CHANGE UP (ref 1.6–2.6)
MCHC RBC-ENTMCNC: 29 PG — SIGNIFICANT CHANGE UP (ref 27–34)
MCHC RBC-ENTMCNC: 31 % — LOW (ref 32–36)
MCV RBC AUTO: 93.6 FL — SIGNIFICANT CHANGE UP (ref 80–100)
MONOCYTES # BLD AUTO: 0.68 K/UL — SIGNIFICANT CHANGE UP (ref 0–0.9)
MONOCYTES NFR BLD AUTO: 10.5 % — SIGNIFICANT CHANGE UP (ref 2–14)
NEUTROPHILS # BLD AUTO: 4.63 K/UL — SIGNIFICANT CHANGE UP (ref 1.8–7.4)
NEUTROPHILS NFR BLD AUTO: 71.2 % — SIGNIFICANT CHANGE UP (ref 43–77)
NRBC # FLD: 0 K/UL — SIGNIFICANT CHANGE UP (ref 0–0)
PHOSPHATE SERPL-MCNC: 3.6 MG/DL — SIGNIFICANT CHANGE UP (ref 2.5–4.5)
PLATELET # BLD AUTO: 204 K/UL — SIGNIFICANT CHANGE UP (ref 150–400)
PMV BLD: 10.9 FL — SIGNIFICANT CHANGE UP (ref 7–13)
POTASSIUM SERPL-MCNC: 4.4 MMOL/L — SIGNIFICANT CHANGE UP (ref 3.5–5.3)
POTASSIUM SERPL-SCNC: 4.4 MMOL/L — SIGNIFICANT CHANGE UP (ref 3.5–5.3)
RBC # BLD: 3.45 M/UL — LOW (ref 4.2–5.8)
RBC # FLD: 13.5 % — SIGNIFICANT CHANGE UP (ref 10.3–14.5)
SODIUM SERPL-SCNC: 141 MMOL/L — SIGNIFICANT CHANGE UP (ref 135–145)
TSH SERPL-MCNC: 0.97 UIU/ML — SIGNIFICANT CHANGE UP (ref 0.27–4.2)
VIT B12 SERPL-MCNC: 579 PG/ML — SIGNIFICANT CHANGE UP (ref 200–900)
WBC # BLD: 6.5 K/UL — SIGNIFICANT CHANGE UP (ref 3.8–10.5)
WBC # FLD AUTO: 6.5 K/UL — SIGNIFICANT CHANGE UP (ref 3.8–10.5)

## 2019-11-15 RX ADMIN — DONEPEZIL HYDROCHLORIDE 5 MILLIGRAM(S): 10 TABLET, FILM COATED ORAL at 21:25

## 2019-11-15 RX ADMIN — ENOXAPARIN SODIUM 40 MILLIGRAM(S): 100 INJECTION SUBCUTANEOUS at 11:14

## 2019-11-15 RX ADMIN — Medication 100 MILLIGRAM(S): at 11:14

## 2019-11-15 RX ADMIN — ATORVASTATIN CALCIUM 10 MILLIGRAM(S): 80 TABLET, FILM COATED ORAL at 21:25

## 2019-11-15 RX ADMIN — RISPERIDONE 0.25 MILLIGRAM(S): 4 TABLET ORAL at 17:27

## 2019-11-15 RX ADMIN — RISPERIDONE 0.25 MILLIGRAM(S): 4 TABLET ORAL at 05:52

## 2019-11-15 RX ADMIN — TAMSULOSIN HYDROCHLORIDE 0.4 MILLIGRAM(S): 0.4 CAPSULE ORAL at 21:25

## 2019-11-15 RX ADMIN — Medication 1 TABLET(S): at 11:14

## 2019-11-15 RX ADMIN — Medication 3 MILLIGRAM(S): at 20:21

## 2019-11-15 NOTE — PROGRESS NOTE ADULT - SUBJECTIVE AND OBJECTIVE BOX
Cardiovascular Disease Progress Note    Overnight events: No acute events overnight. Mr. Crowe denies chest pain or SOB.    Otherwise review of systems negative    Objective Findings:  T(C): 36.6 (11-15-19 @ 05:50), Max: 36.7 (11-14-19 @ 21:29)  HR: 78 (11-15-19 @ 05:50) (78 - 94)  BP: 100/58 (11-15-19 @ 05:50) (100/58 - 146/82)  RR: 16 (11-15-19 @ 05:50) (16 - 18)  SpO2: 97% (11-15-19 @ 05:50) (97% - 100%)  Wt(kg): --  Daily     Daily       Physical Exam:  Gen: NAD; Patient resting comfortably  HEENT: EOMI, Normocephalic/ atraumatic  CV: RRR, normal S1 + S2, no m/r/g  Lungs:  Normal respiratory effort; clear to auscultation bilaterally  Abd: soft, non-tender; bowel sounds present  Ext: No edema; warm and well perfused    Telemetry: n/a    Laboratory Data:                        10.0   6.50  )-----------( 204      ( 15 Nov 2019 06:42 )             32.3     11-15    141  |  103  |  17  ----------------------------<  90  4.4   |  27  |  1.11    Ca    9.6      15 Nov 2019 06:42  Phos  3.6     11-15  Mg     1.8     11-15                Inpatient Medications:  MEDICATIONS  (STANDING):  atorvastatin 10 milliGRAM(s) Oral at bedtime  clopidogrel Tablet 75 milliGRAM(s) Oral daily  diltiazem    milliGRAM(s) Oral daily  donepezil 5 milliGRAM(s) Oral at bedtime  enoxaparin Injectable 40 milliGRAM(s) SubCutaneous daily  melatonin 3 milliGRAM(s) Oral <User Schedule>  multivitamin 1 Tablet(s) Oral daily  risperiDONE   Tablet 0.25 milliGRAM(s) Oral two times a day  tamsulosin 0.4 milliGRAM(s) Oral at bedtime  thiamine 100 milliGRAM(s) Oral daily      Assessment:  87 year old man with dementia, reported CAD on plavix, HTN, HLD, and a-fib not on AC presents with mechanical fall and aggression.    Plan of Care:    #Pre-operative evaluation-  Mr. Crowe displays no signs or symptoms of coronary ischemia or decompensated CHF.   Echocardiogram from 7/30/2019 reviewed- normal LV systolic function with no significant valvular disease.  Patient is optimized from a cardiac standpoint to proceed with L inguinal hernia surgery.     #A-fib-  Rate controlled on cardizem.  Would hold off on AC given frequent falls.     #CAD-  EKG is a-fib without ischemic changes.  Recent echo with preserved LVEF.  Continue Plavix with statin.    #HTN-  BP acceptable on current regimen.       Over 25 minutes spent on total encounter; more than 50% of the visit was spent counseling and/or coordinating care by the attending physician.      Ronald Williamson MD [facility]  Cardiovascular Disease  [phone] Cardiovascular Disease Progress Note    Overnight events: No acute events overnight. Mr. Crowe denies chest pain or SOB.    Otherwise review of systems negative    Objective Findings:  T(C): 36.6 (11-15-19 @ 05:50), Max: 36.7 (11-14-19 @ 21:29)  HR: 78 (11-15-19 @ 05:50) (78 - 94)  BP: 100/58 (11-15-19 @ 05:50) (100/58 - 146/82)  RR: 16 (11-15-19 @ 05:50) (16 - 18)  SpO2: 97% (11-15-19 @ 05:50) (97% - 100%)  Wt(kg): --  Daily     Daily       Physical Exam:  Gen: NAD; Patient resting comfortably  HEENT: EOMI, Normocephalic/ atraumatic  CV: RRR, normal S1 + S2, no m/r/g  Lungs:  Normal respiratory effort; clear to auscultation bilaterally  Abd: soft, non-tender; bowel sounds present  Ext: No edema; warm and well perfused    Telemetry: n/a    Laboratory Data:                        10.0   6.50  )-----------( 204      ( 15 Nov 2019 06:42 )             32.3     11-15    141  |  103  |  17  ----------------------------<  90  4.4   |  27  |  1.11    Ca    9.6      15 Nov 2019 06:42  Phos  3.6     11-15  Mg     1.8     11-15                Inpatient Medications:  MEDICATIONS  (STANDING):  atorvastatin 10 milliGRAM(s) Oral at bedtime  clopidogrel Tablet 75 milliGRAM(s) Oral daily  diltiazem    milliGRAM(s) Oral daily  donepezil 5 milliGRAM(s) Oral at bedtime  enoxaparin Injectable 40 milliGRAM(s) SubCutaneous daily  melatonin 3 milliGRAM(s) Oral <User Schedule>  multivitamin 1 Tablet(s) Oral daily  risperiDONE   Tablet 0.25 milliGRAM(s) Oral two times a day  tamsulosin 0.4 milliGRAM(s) Oral at bedtime  thiamine 100 milliGRAM(s) Oral daily      Assessment:  87 year old man with dementia, reported CAD on plavix, HTN, HLD, and a-fib not on AC presents with mechanical fall and aggression.    Plan of Care:    #Pre-operative evaluation-  Mr. Crowe displays no signs or symptoms of coronary ischemia or decompensated CHF.   Echocardiogram from 7/30/2019 reviewed- normal LV systolic function with no significant valvular disease.  Patient is optimized from a cardiac standpoint to proceed with L inguinal hernia surgery.     #A-fib-  Rate controlled on cardizem.  Would hold off on AC given frequent falls.     #CAD-  EKG is a-fib without ischemic changes.  Recent echo with preserved LVEF.  May hold Plavix for upcoming surgery.   Continue with statin.    #HTN-  BP acceptable on current regimen.       Over 25 minutes spent on total encounter; more than 50% of the visit was spent counseling and/or coordinating care by the attending physician.      Ronald Williamson MD [facility]  Cardiovascular Disease  [phone]

## 2019-11-15 NOTE — PROGRESS NOTE ADULT - ASSESSMENT
88 yo male with a dementia, CAD on plavix, HTN, HLD, afib, BPH brought in by daughter s/p fall admitted for behavioral disturbance and assistance with placement     Problem/Plan - 1:  ·  Problem: Fall.  Plan: -Pt presents s/p mechanical fall in the setting of aggression. Witnessed fall as per daughter  -With evidence of head trauma as per periorbital ecchymosis and swelling on exam. CT head negative for acute pathology but demonstrates partially visualized healed left facial fracture. There is evidence of internal fixation of a right facial fracture, incompletely seen.  -PT eval in AM.      Problem/Plan - 2:  ·  Problem: Chest pain.  Plan: - Cardiology helping.   -Pt reports reproducible chest pain likely 2/2 recent fall. Denies chest pain with exertion or activity. EKG without acute ST changes.   -CXR without evidence of acute fracture   -Low suspicion for ACS, continue with plavix for h/o of CAD. Pt would be a poor candidate for cardiac intervention in the setting of worsening dementia.      Problem/Plan - 3:  ·  Problem: Aggression.  Plan: -likely 2/2 to delirium vs progression of dementia. CT head without acute pathology  -no identifiable infectious or metabolic cause. UA neg, CXR neg  -Seroquel 12.5 mg PO q6h as needed as per behavioral health recs last admission. IV haldol 0.5 mg q6 prn if unable to tolerate PO'  Psychiatry help appreciated.      Problem/Plan - 4:  ·  Problem: Dementia.  Plan: -QTc 440, treat agitation with Seroquel 12.5 mg PO q6h as needed as per behavioral health recs last admission. IV haldol .5 mg q6 prn if unable to tolerate PO. In am consider behavioral health c/s for recs regarding long term control of aggression   -SW and CM c/s  -c/w donepezil.      Problem/Plan - 5:  ·  Problem: Atrial fibrillation.  Plan: - Cardiology helping. . Continue with diltiazem for rate control  -off anticoagulation given high risk for falling.      Problem/Plan - 6:  Problem: CAD (coronary artery disease). Plan: -continue with plavix .  -continue with statin.     Problem/Plan - 7:  ·  Problem: BPH (benign prostatic hyperplasia).  Plan: -continue with tamsulosin.      Problem/Plan - 8:  ·  Problem: Hypertension, unspecified type.  Plan: -continue with diltiazem.      Problem/Plan - 9:  ·  Problem: Inguinal hernia   Plan: Surgery was consulted and planning intervention on Monday .     Medically optimized for the surgery .

## 2019-11-15 NOTE — PROGRESS NOTE ADULT - ASSESSMENT
87M w/ reducible Left inguinal hernia, asymptomatic    - Pt on schedule for inguinal hernia repair next Monday  - Please document medical clearance for OR  - Please document cardiac optimization for OR  - Will consent over the weekend  - Discussed w/ Dr. Joaquin Marshall PGY-2  A Surgery  [phone]

## 2019-11-15 NOTE — PROGRESS NOTE ADULT - SUBJECTIVE AND OBJECTIVE BOX
General Surgery Progress Note     S: Pt seen and examined at bedside during AM rounds  - No acute events overnight, pt states he agrees w/ L inguinal hernia surgery  next week  - denies f/c, n/v, CP, abd pain, SOB, lightheadedness.       OBJECTIVE:     ** PHYSICAL EXAM **    Gen: NAD, lying comfortably in bed  HEENT: NC/AT  RESP: nonlabored breathing  ABDOMEN: soft, non-distended, non-tender, left reducible inguinal hernia      ** VITAL SIGNS / I&O's **    Vital Signs Last 24 Hrs  T(C): 36.6 (15 Nov 2019 05:50), Max: 36.7 (14 Nov 2019 21:29)  T(F): 97.9 (15 Nov 2019 05:50), Max: 98.1 (14 Nov 2019 21:29)  HR: 78 (15 Nov 2019 05:50) (78 - 94)  BP: 100/58 (15 Nov 2019 05:50) (100/58 - 146/82)  BP(mean): --  RR: 16 (15 Nov 2019 05:50) (16 - 18)  SpO2: 97% (15 Nov 2019 05:50) (97% - 100%)          ** LABS **                          10.9   7.96  )-----------( 215      ( 14 Nov 2019 06:45 )             35.2             CAPILLARY BLOOD GLUCOSE                Culture - Urine (collected 12 Nov 2019 17:45)  Source: URINE MIDSTREAM  Final Report (14 Nov 2019 14:58):    GPC^Gram pos. cocci    COLONY COUNT: LESS THAN 10,000 CFU/ML          MEDICATIONS  (STANDING):  atorvastatin 10 milliGRAM(s) Oral at bedtime  clopidogrel Tablet 75 milliGRAM(s) Oral daily  diltiazem    milliGRAM(s) Oral daily  donepezil 5 milliGRAM(s) Oral at bedtime  enoxaparin Injectable 40 milliGRAM(s) SubCutaneous daily  melatonin 3 milliGRAM(s) Oral <User Schedule>  multivitamin 1 Tablet(s) Oral daily  risperiDONE   Tablet 0.25 milliGRAM(s) Oral two times a day  tamsulosin 0.4 milliGRAM(s) Oral at bedtime  thiamine 100 milliGRAM(s) Oral daily    MEDICATIONS  (PRN):  haloperidol    Injectable 0.5 milliGRAM(s) IV Push every 6 hours PRN Agitation  QUEtiapine 12.5 milliGRAM(s) Oral every 6 hours PRN Agitation

## 2019-11-15 NOTE — PROGRESS NOTE ADULT - SUBJECTIVE AND OBJECTIVE BOX
INTERVAL HPI/OVERNIGHT EVENTS: No new concerns.   Vital Signs Last 24 Hrs  T(C): 36.6 (15 Nov 2019 12:59), Max: 36.7 (14 Nov 2019 21:29)  T(F): 97.8 (15 Nov 2019 12:59), Max: 98.1 (14 Nov 2019 21:29)  HR: 94 (15 Nov 2019 12:59) (78 - 94)  BP: 128/74 (15 Nov 2019 12:59) (100/58 - 146/82)  BP(mean): --  RR: 16 (15 Nov 2019 12:59) (16 - 18)  SpO2: 100% (15 Nov 2019 12:59) (97% - 100%)  I&O's Summary    MEDICATIONS  (STANDING):  atorvastatin 10 milliGRAM(s) Oral at bedtime  diltiazem    milliGRAM(s) Oral daily  donepezil 5 milliGRAM(s) Oral at bedtime  enoxaparin Injectable 40 milliGRAM(s) SubCutaneous daily  melatonin 3 milliGRAM(s) Oral <User Schedule>  multivitamin 1 Tablet(s) Oral daily  risperiDONE   Tablet 0.25 milliGRAM(s) Oral two times a day  tamsulosin 0.4 milliGRAM(s) Oral at bedtime  thiamine 100 milliGRAM(s) Oral daily    MEDICATIONS  (PRN):  haloperidol    Injectable 0.5 milliGRAM(s) IV Push every 6 hours PRN Agitation  QUEtiapine 12.5 milliGRAM(s) Oral every 6 hours PRN Agitation    LABS:                        10.0   6.50  )-----------( 204      ( 15 Nov 2019 06:42 )             32.3     11-15    141  |  103  |  17  ----------------------------<  90  4.4   |  27  |  1.11    Ca    9.6      15 Nov 2019 06:42  Phos  3.6     11-15  Mg     1.8     11-15          CAPILLARY BLOOD GLUCOSE              REVIEW OF SYSTEMS:  CONSTITUTIONAL: No fever, weight loss, or fatigue  EYES: No eye pain, visual disturbances, or discharge  ENMT:  No difficulty hearing, tinnitus, vertigo; No sinus or throat pain  NECK: No pain or stiffness  RESPIRATORY: No cough, wheezing, chills or hemoptysis; No shortness of breath  CARDIOVASCULAR: No chest pain, palpitations, dizziness, or leg swelling  GASTROINTESTINAL: No abdominal or epigastric pain. No nausea, vomiting, or hematemesis; No diarrhea or constipation. No melena or hematochezia.  GENITOURINARY: No dysuria, frequency, hematuria, or incontinence  NEUROLOGICAL: No headaches, memory loss, loss of strength, numbness, or tremors    Consultant(s) Notes Reviewed:  [x ] YES  [ ] NO    PHYSICAL EXAM:  GENERAL: NAD, well-groomed, well-developed,not in any distress ,  HEAD:  Atraumatic, Normocephalic  EYES: EOMI, PERRLA, conjunctiva and sclera clear  ENMT: No tonsillar erythema, exudates, or enlargement; Moist mucous membranes, Good dentition, No lesions  NECK: Supple, No JVD, Normal thyroid  NERVOUS SYSTEM:  Alert & Oriented X2, No focal deficit   CHEST/LUNG: Good air entry bilateral with no  rales, rhonchi, wheezing, or rubs  HEART: Regular rate and rhythm; No murmurs, rubs, or gallops  ABDOMEN: Soft, Nontender, Nondistended; Bowel sounds present, inguinal hernia   EXTREMITIES:  2+ Peripheral Pulses, No clubbing, cyanosis, or edema  SKIN: No rashes or lesions    Care Discussed with Consultants/Other Providers [ x] YES  [ ] NO

## 2019-11-16 LAB
ANION GAP SERPL CALC-SCNC: 11 MMO/L — SIGNIFICANT CHANGE UP (ref 7–14)
BASOPHILS # BLD AUTO: 0.06 K/UL — SIGNIFICANT CHANGE UP (ref 0–0.2)
BASOPHILS NFR BLD AUTO: 1 % — SIGNIFICANT CHANGE UP (ref 0–2)
BUN SERPL-MCNC: 23 MG/DL — SIGNIFICANT CHANGE UP (ref 7–23)
CALCIUM SERPL-MCNC: 9.8 MG/DL — SIGNIFICANT CHANGE UP (ref 8.4–10.5)
CHLORIDE SERPL-SCNC: 102 MMOL/L — SIGNIFICANT CHANGE UP (ref 98–107)
CO2 SERPL-SCNC: 28 MMOL/L — SIGNIFICANT CHANGE UP (ref 22–31)
CREAT SERPL-MCNC: 1.17 MG/DL — SIGNIFICANT CHANGE UP (ref 0.5–1.3)
EOSINOPHIL # BLD AUTO: 0.24 K/UL — SIGNIFICANT CHANGE UP (ref 0–0.5)
EOSINOPHIL NFR BLD AUTO: 3.9 % — SIGNIFICANT CHANGE UP (ref 0–6)
GLUCOSE SERPL-MCNC: 83 MG/DL — SIGNIFICANT CHANGE UP (ref 70–99)
HCT VFR BLD CALC: 34.5 % — LOW (ref 39–50)
HGB BLD-MCNC: 10.7 G/DL — LOW (ref 13–17)
IMM GRANULOCYTES NFR BLD AUTO: 0.3 % — SIGNIFICANT CHANGE UP (ref 0–1.5)
LYMPHOCYTES # BLD AUTO: 1.23 K/UL — SIGNIFICANT CHANGE UP (ref 1–3.3)
LYMPHOCYTES # BLD AUTO: 20.1 % — SIGNIFICANT CHANGE UP (ref 13–44)
MAGNESIUM SERPL-MCNC: 1.9 MG/DL — SIGNIFICANT CHANGE UP (ref 1.6–2.6)
MCHC RBC-ENTMCNC: 28.9 PG — SIGNIFICANT CHANGE UP (ref 27–34)
MCHC RBC-ENTMCNC: 31 % — LOW (ref 32–36)
MCV RBC AUTO: 93.2 FL — SIGNIFICANT CHANGE UP (ref 80–100)
MONOCYTES # BLD AUTO: 0.59 K/UL — SIGNIFICANT CHANGE UP (ref 0–0.9)
MONOCYTES NFR BLD AUTO: 9.6 % — SIGNIFICANT CHANGE UP (ref 2–14)
NEUTROPHILS # BLD AUTO: 3.98 K/UL — SIGNIFICANT CHANGE UP (ref 1.8–7.4)
NEUTROPHILS NFR BLD AUTO: 65.1 % — SIGNIFICANT CHANGE UP (ref 43–77)
NRBC # FLD: 0 K/UL — SIGNIFICANT CHANGE UP (ref 0–0)
PHOSPHATE SERPL-MCNC: 2.9 MG/DL — SIGNIFICANT CHANGE UP (ref 2.5–4.5)
PLATELET # BLD AUTO: 220 K/UL — SIGNIFICANT CHANGE UP (ref 150–400)
PMV BLD: 11.1 FL — SIGNIFICANT CHANGE UP (ref 7–13)
POTASSIUM SERPL-MCNC: 4.5 MMOL/L — SIGNIFICANT CHANGE UP (ref 3.5–5.3)
POTASSIUM SERPL-SCNC: 4.5 MMOL/L — SIGNIFICANT CHANGE UP (ref 3.5–5.3)
RBC # BLD: 3.7 M/UL — LOW (ref 4.2–5.8)
RBC # FLD: 13.5 % — SIGNIFICANT CHANGE UP (ref 10.3–14.5)
SODIUM SERPL-SCNC: 141 MMOL/L — SIGNIFICANT CHANGE UP (ref 135–145)
WBC # BLD: 6.12 K/UL — SIGNIFICANT CHANGE UP (ref 3.8–10.5)
WBC # FLD AUTO: 6.12 K/UL — SIGNIFICANT CHANGE UP (ref 3.8–10.5)

## 2019-11-16 RX ADMIN — TAMSULOSIN HYDROCHLORIDE 0.4 MILLIGRAM(S): 0.4 CAPSULE ORAL at 20:30

## 2019-11-16 RX ADMIN — Medication 3 MILLIGRAM(S): at 20:30

## 2019-11-16 RX ADMIN — Medication 120 MILLIGRAM(S): at 06:00

## 2019-11-16 RX ADMIN — DONEPEZIL HYDROCHLORIDE 5 MILLIGRAM(S): 10 TABLET, FILM COATED ORAL at 20:30

## 2019-11-16 RX ADMIN — RISPERIDONE 0.25 MILLIGRAM(S): 4 TABLET ORAL at 06:00

## 2019-11-16 RX ADMIN — ENOXAPARIN SODIUM 40 MILLIGRAM(S): 100 INJECTION SUBCUTANEOUS at 12:20

## 2019-11-16 RX ADMIN — Medication 1 TABLET(S): at 12:19

## 2019-11-16 RX ADMIN — Medication 100 MILLIGRAM(S): at 12:20

## 2019-11-16 RX ADMIN — ATORVASTATIN CALCIUM 10 MILLIGRAM(S): 80 TABLET, FILM COATED ORAL at 20:30

## 2019-11-16 RX ADMIN — RISPERIDONE 0.25 MILLIGRAM(S): 4 TABLET ORAL at 17:38

## 2019-11-16 NOTE — PROGRESS NOTE ADULT - SUBJECTIVE AND OBJECTIVE BOX
GENERAL SURGERY DAILY PROGRESS NOTE:    Interval:  No acute events overnight.    Subjective:  Patient reports pain is well controlled. Denies N/V. Tolerating diet. Passing flatus, +BM.    Vital Signs Last 24 Hrs  T(C): 37.1 (16 Nov 2019 08:20), Max: 37.1 (16 Nov 2019 08:20)  T(F): 98.7 (16 Nov 2019 08:20), Max: 98.7 (16 Nov 2019 08:20)  HR: 98 (16 Nov 2019 08:20) (87 - 98)  BP: 130/88 (16 Nov 2019 08:20) (101/56 - 145/87)  BP(mean): --  RR: 17 (16 Nov 2019 08:20) (16 - 17)  SpO2: 99% (16 Nov 2019 08:20) (97% - 100%)    Exam:  Gen: NAD, resting in bed, alert and responding appropriately  Resp: Airway patent, non-labored respirations  Abd: Soft, ND, NT, no rebound or guarding.  Ext: No edema, WWP  Neuro: AAOx3, no focal deficits    I&O's Detail      LABS:                        10.7   6.12  )-----------( 220      ( 16 Nov 2019 05:30 )             34.5     11-16    141  |  102  |  23  ----------------------------<  83  4.5   |  28  |  1.17    Ca    9.8      16 Nov 2019 05:30  Phos  2.9     11-16  Mg     1.9     11-16

## 2019-11-16 NOTE — PROGRESS NOTE ADULT - SUBJECTIVE AND OBJECTIVE BOX
INTERVAL HPI/OVERNIGHT EVENTS: I feel fine. Eating lunch .   Vital Signs Last 24 Hrs  T(C): 37.1 (16 Nov 2019 08:20), Max: 37.1 (16 Nov 2019 08:20)  T(F): 98.7 (16 Nov 2019 08:20), Max: 98.7 (16 Nov 2019 08:20)  HR: 98 (16 Nov 2019 08:20) (87 - 98)  BP: 130/88 (16 Nov 2019 08:20) (101/56 - 145/87)  BP(mean): --  RR: 17 (16 Nov 2019 08:20) (17 - 17)  SpO2: 99% (16 Nov 2019 08:20) (97% - 100%)  I&O's Summary    MEDICATIONS  (STANDING):  atorvastatin 10 milliGRAM(s) Oral at bedtime  diltiazem    milliGRAM(s) Oral daily  donepezil 5 milliGRAM(s) Oral at bedtime  enoxaparin Injectable 40 milliGRAM(s) SubCutaneous daily  melatonin 3 milliGRAM(s) Oral <User Schedule>  multivitamin 1 Tablet(s) Oral daily  risperiDONE   Tablet 0.25 milliGRAM(s) Oral two times a day  tamsulosin 0.4 milliGRAM(s) Oral at bedtime  thiamine 100 milliGRAM(s) Oral daily    MEDICATIONS  (PRN):  haloperidol    Injectable 0.5 milliGRAM(s) IV Push every 6 hours PRN Agitation  QUEtiapine 12.5 milliGRAM(s) Oral every 6 hours PRN Agitation    LABS:                        10.7   6.12  )-----------( 220      ( 16 Nov 2019 05:30 )             34.5     11-16    141  |  102  |  23  ----------------------------<  83  4.5   |  28  |  1.17    Ca    9.8      16 Nov 2019 05:30  Phos  2.9     11-16  Mg     1.9     11-16          CAPILLARY BLOOD GLUCOSE              REVIEW OF SYSTEMS:  CONSTITUTIONAL: No fever, weight loss, or fatigue  EYES: No eye pain, visual disturbances, or discharge  ENMT:  No difficulty hearing, tinnitus, vertigo; No sinus or throat pain  NECK: No pain or stiffness  RESPIRATORY: No cough, wheezing, chills or hemoptysis; No shortness of breath  CARDIOVASCULAR: No chest pain, palpitations, dizziness, or leg swelling  GASTROINTESTINAL: No abdominal or epigastric pain. No nausea, vomiting, or hematemesis; No diarrhea or constipation. No melena or hematochezia.  GENITOURINARY: No dysuria, frequency, hematuria, or incontinence  NEUROLOGICAL: No headaches, memory loss, loss of strength, numbness, or tremors    Consultant(s) Notes Reviewed:  [x ] YES  [ ] NO    PHYSICAL EXAM:  GENERAL: NAD, well-groomed, well-developed,not in any distress ,  HEAD:  Atraumatic, Normocephalic  EYES: EOMI, PERRLA, conjunctiva and sclera clear  ENMT: No tonsillar erythema, exudates, or enlargement; Moist mucous membranes, Good dentition, No lesions  NECK: Supple, No JVD, Normal thyroid  NERVOUS SYSTEM:  Alert & Oriented X2 to 3, No focal deficit   CHEST/LUNG: Good air entry bilateral with no  rales, rhonchi, wheezing, or rubs  HEART: Regular rate and rhythm; No murmurs, rubs, or gallops  ABDOMEN: Soft, Nontender, Nondistended; Bowel sounds present  EXTREMITIES:  2+ Peripheral Pulses, No clubbing, cyanosis, or edema  Care Discussed with Consultants/Other Providers [ x] YES  [ ] NO

## 2019-11-16 NOTE — PROGRESS NOTE ADULT - SUBJECTIVE AND OBJECTIVE BOX
Cardiovascular Disease Progress Note    Overnight events: No acute events overnight. Mr. Crowe denies chest pain or SOB.     Otherwise review of systems negative    Objective Findings:  T(C): 37.1 (11-16-19 @ 08:20), Max: 37.1 (11-16-19 @ 08:20)  HR: 98 (11-16-19 @ 08:20) (87 - 98)  BP: 130/88 (11-16-19 @ 08:20) (101/56 - 145/87)  RR: 17 (11-16-19 @ 08:20) (16 - 17)  SpO2: 99% (11-16-19 @ 08:20) (97% - 100%)  Wt(kg): --  Daily     Daily       Physical Exam:  Gen: NAD; Patient resting comfortably  HEENT: EOMI, Normocephalic/ atraumatic  CV: RRR, normal S1 + S2, no m/r/g  Lungs:  Normal respiratory effort; clear to auscultation bilaterally  Abd: soft, non-tender; bowel sounds present  Ext: No edema; warm and well perfused    Telemetry: n/a    Laboratory Data:                        10.7   6.12  )-----------( 220      ( 16 Nov 2019 05:30 )             34.5     11-16    141  |  102  |  23  ----------------------------<  83  4.5   |  28  |  1.17    Ca    9.8      16 Nov 2019 05:30  Phos  2.9     11-16  Mg     1.9     11-16                Inpatient Medications:  MEDICATIONS  (STANDING):  atorvastatin 10 milliGRAM(s) Oral at bedtime  diltiazem    milliGRAM(s) Oral daily  donepezil 5 milliGRAM(s) Oral at bedtime  enoxaparin Injectable 40 milliGRAM(s) SubCutaneous daily  melatonin 3 milliGRAM(s) Oral <User Schedule>  multivitamin 1 Tablet(s) Oral daily  risperiDONE   Tablet 0.25 milliGRAM(s) Oral two times a day  tamsulosin 0.4 milliGRAM(s) Oral at bedtime  thiamine 100 milliGRAM(s) Oral daily      Assessment: 87 year old man with dementia, reported CAD on plavix, HTN, HLD, and a-fib not on AC presents with mechanical fall and aggression.    Plan of Care:    #Pre-operative evaluation-  Mr. Crowe displays no signs or symptoms of coronary ischemia or decompensated CHF.   Echocardiogram from 7/30/2019 reviewed- normal LV systolic function with no significant valvular disease.  Patient is optimized from a cardiac standpoint to proceed with L inguinal hernia surgery.     #A-fib-  Rate controlled on cardizem.  Would hold off on AC given frequent falls.     #CAD-  EKG is a-fib without ischemic changes.  Recent echo with preserved LVEF.  May hold Plavix for upcoming surgery.   Continue with statin.    #HTN-  BP acceptable on current regimen.       Over 25 minutes spent on total encounter; more than 50% of the visit was spent counseling and/or coordinating care by the attending physician.      Ronald Williamson MD [facility]  Cardiovascular Disease  [phone]

## 2019-11-16 NOTE — PROGRESS NOTE ADULT - ASSESSMENT
87M w/ reducible Left inguinal hernia, asymptomatic    - Pt on schedule for inguinal hernia repair Monday (11/18/19)  - Consent is in chart  - Ordered pre-operative labs and type and screen for tomorrow morning  - Please have patient be NPO after 23:59 on 11/17/19    A Surgery  [id number] 87M w/ reducible Left inguinal hernia, asymptomatic    - Pt on schedule for left inguinal hernia repair Monday (11/18/19)  - Consent is in chart  - Ordered pre-operative labs and type and screen for tomorrow morning  - Please have patient be NPO after 23:59 on 11/17/19    A Surgery  [id number]

## 2019-11-16 NOTE — PROGRESS NOTE ADULT - ASSESSMENT
86 yo male with a dementia, CAD on plavix, HTN, HLD, afib, BPH brought in by daughter s/p fall admitted for behavioral disturbance and assistance with placement     Problem/Plan - 1:  ·  Problem: Fall.  Plan: -Pt presents s/p mechanical fall in the setting of aggression. Witnessed fall as per daughter  -With evidence of head trauma as per periorbital ecchymosis and swelling on exam. CT head negative for acute pathology but demonstrates partially visualized healed left facial fracture. There is evidence of internal fixation of a right facial fracture, incompletely seen.  -PT eval in AM.      Problem/Plan - 2:  ·  Problem: Chest pain.  Plan: - Cardiology helping.   -Pt reports reproducible chest pain likely 2/2 recent fall. Denies chest pain with exertion or activity. EKG without acute ST changes.   -CXR without evidence of acute fracture   -Low suspicion for ACS, continue with plavix for h/o of CAD. Pt would be a poor candidate for cardiac intervention in the setting of worsening dementia.      Problem/Plan - 3:  ·  Problem: Aggression.  Plan: -likely 2/2 to delirium vs progression of dementia. CT head without acute pathology  -no identifiable infectious or metabolic cause. UA neg, CXR neg  -Seroquel 12.5 mg PO q6h as needed as per behavioral health recs last admission. IV haldol 0.5 mg q6 prn if unable to tolerate PO'  Psychiatry help appreciated.      Problem/Plan - 4:  ·  Problem: Dementia.  Plan: -QTc 440, treat agitation with Seroquel 12.5 mg PO q6h as needed as per behavioral health recs last admission. IV haldol .5 mg q6 prn if unable to tolerate PO. In am consider behavioral health c/s for recs regarding long term control of aggression   -SW and CM c/s  -c/w donepezil.      Problem/Plan - 5:  ·  Problem: Atrial fibrillation.  Plan: - Cardiology helping. . Continue with diltiazem for rate control  -off anticoagulation given high risk for falling.      Problem/Plan - 6:  Problem: CAD (coronary artery disease). Plan: -continue with plavix .  -continue with statin.     Problem/Plan - 7:  ·  Problem: BPH (benign prostatic hyperplasia).  Plan: -continue with tamsulosin.      Problem/Plan - 8:  ·  Problem: Hypertension, unspecified type.  Plan: -continue with diltiazem.      Problem/Plan - 9:  ·  Problem: Inguinal hernia   Plan: Surgery was consulted and planning surgery  on Monday .     Medically optimized for the surgery .

## 2019-11-17 LAB
ANION GAP SERPL CALC-SCNC: 10 MMO/L — SIGNIFICANT CHANGE UP (ref 7–14)
BASOPHILS # BLD AUTO: 0.06 K/UL — SIGNIFICANT CHANGE UP (ref 0–0.2)
BASOPHILS NFR BLD AUTO: 0.7 % — SIGNIFICANT CHANGE UP (ref 0–2)
BLD GP AB SCN SERPL QL: NEGATIVE — SIGNIFICANT CHANGE UP
BUN SERPL-MCNC: 28 MG/DL — HIGH (ref 7–23)
CALCIUM SERPL-MCNC: 9.4 MG/DL — SIGNIFICANT CHANGE UP (ref 8.4–10.5)
CHLORIDE SERPL-SCNC: 104 MMOL/L — SIGNIFICANT CHANGE UP (ref 98–107)
CO2 SERPL-SCNC: 26 MMOL/L — SIGNIFICANT CHANGE UP (ref 22–31)
CREAT SERPL-MCNC: 1.2 MG/DL — SIGNIFICANT CHANGE UP (ref 0.5–1.3)
EOSINOPHIL # BLD AUTO: 0.23 K/UL — SIGNIFICANT CHANGE UP (ref 0–0.5)
EOSINOPHIL NFR BLD AUTO: 2.9 % — SIGNIFICANT CHANGE UP (ref 0–6)
GLUCOSE SERPL-MCNC: 72 MG/DL — SIGNIFICANT CHANGE UP (ref 70–99)
HCT VFR BLD CALC: 31.6 % — LOW (ref 39–50)
HGB BLD-MCNC: 9.8 G/DL — LOW (ref 13–17)
IMM GRANULOCYTES NFR BLD AUTO: 0.4 % — SIGNIFICANT CHANGE UP (ref 0–1.5)
INR BLD: 1.13 — SIGNIFICANT CHANGE UP (ref 0.88–1.17)
LYMPHOCYTES # BLD AUTO: 1.35 K/UL — SIGNIFICANT CHANGE UP (ref 1–3.3)
LYMPHOCYTES # BLD AUTO: 16.8 % — SIGNIFICANT CHANGE UP (ref 13–44)
MAGNESIUM SERPL-MCNC: 1.9 MG/DL — SIGNIFICANT CHANGE UP (ref 1.6–2.6)
MCHC RBC-ENTMCNC: 28.7 PG — SIGNIFICANT CHANGE UP (ref 27–34)
MCHC RBC-ENTMCNC: 31 % — LOW (ref 32–36)
MCV RBC AUTO: 92.7 FL — SIGNIFICANT CHANGE UP (ref 80–100)
MONOCYTES # BLD AUTO: 0.74 K/UL — SIGNIFICANT CHANGE UP (ref 0–0.9)
MONOCYTES NFR BLD AUTO: 9.2 % — SIGNIFICANT CHANGE UP (ref 2–14)
NEUTROPHILS # BLD AUTO: 5.62 K/UL — SIGNIFICANT CHANGE UP (ref 1.8–7.4)
NEUTROPHILS NFR BLD AUTO: 70 % — SIGNIFICANT CHANGE UP (ref 43–77)
NRBC # FLD: 0 K/UL — SIGNIFICANT CHANGE UP (ref 0–0)
PHOSPHATE SERPL-MCNC: 3 MG/DL — SIGNIFICANT CHANGE UP (ref 2.5–4.5)
PLATELET # BLD AUTO: 215 K/UL — SIGNIFICANT CHANGE UP (ref 150–400)
PMV BLD: 10.9 FL — SIGNIFICANT CHANGE UP (ref 7–13)
POTASSIUM SERPL-MCNC: 4.3 MMOL/L — SIGNIFICANT CHANGE UP (ref 3.5–5.3)
POTASSIUM SERPL-SCNC: 4.3 MMOL/L — SIGNIFICANT CHANGE UP (ref 3.5–5.3)
PROTHROM AB SERPL-ACNC: 12.6 SEC — SIGNIFICANT CHANGE UP (ref 9.8–13.1)
RBC # BLD: 3.41 M/UL — LOW (ref 4.2–5.8)
RBC # FLD: 13.4 % — SIGNIFICANT CHANGE UP (ref 10.3–14.5)
RH IG SCN BLD-IMP: POSITIVE — SIGNIFICANT CHANGE UP
SODIUM SERPL-SCNC: 140 MMOL/L — SIGNIFICANT CHANGE UP (ref 135–145)
WBC # BLD: 8.03 K/UL — SIGNIFICANT CHANGE UP (ref 3.8–10.5)
WBC # FLD AUTO: 8.03 K/UL — SIGNIFICANT CHANGE UP (ref 3.8–10.5)

## 2019-11-17 RX ORDER — SODIUM CHLORIDE 9 MG/ML
1000 INJECTION, SOLUTION INTRAVENOUS
Refills: 0 | Status: DISCONTINUED | OUTPATIENT
Start: 2019-11-17 | End: 2019-11-18

## 2019-11-17 RX ADMIN — TAMSULOSIN HYDROCHLORIDE 0.4 MILLIGRAM(S): 0.4 CAPSULE ORAL at 21:38

## 2019-11-17 RX ADMIN — ENOXAPARIN SODIUM 40 MILLIGRAM(S): 100 INJECTION SUBCUTANEOUS at 11:01

## 2019-11-17 RX ADMIN — Medication 120 MILLIGRAM(S): at 06:38

## 2019-11-17 RX ADMIN — RISPERIDONE 0.25 MILLIGRAM(S): 4 TABLET ORAL at 17:31

## 2019-11-17 RX ADMIN — Medication 3 MILLIGRAM(S): at 21:38

## 2019-11-17 RX ADMIN — DONEPEZIL HYDROCHLORIDE 5 MILLIGRAM(S): 10 TABLET, FILM COATED ORAL at 21:39

## 2019-11-17 RX ADMIN — Medication 100 MILLIGRAM(S): at 11:01

## 2019-11-17 RX ADMIN — Medication 1 TABLET(S): at 11:01

## 2019-11-17 RX ADMIN — ATORVASTATIN CALCIUM 10 MILLIGRAM(S): 80 TABLET, FILM COATED ORAL at 21:38

## 2019-11-17 RX ADMIN — RISPERIDONE 0.25 MILLIGRAM(S): 4 TABLET ORAL at 06:38

## 2019-11-17 NOTE — CHART NOTE - NSCHARTNOTEFT_GEN_A_CORE
Preop Dx: Inguinal Hernia  Surgeon: Dr. Romero  Procedure: Right inguinal hernia repair    Vital Signs Last 24 Hrs  T(C): 36.4 (17 Nov 2019 12:01), Max: 36.9 (16 Nov 2019 16:31)  T(F): 97.6 (17 Nov 2019 12:01), Max: 98.4 (16 Nov 2019 16:31)  HR: 82 (17 Nov 2019 12:01) (68 - 99)  BP: 128/76 (17 Nov 2019 12:01) (105/64 - 132/88)  BP(mean): --  RR: 16 (17 Nov 2019 12:01) (16 - 18)  SpO2: 100% (17 Nov 2019 12:01) (98% - 100%)                        9.8    8.03  )-----------( 215      ( 17 Nov 2019 06:30 )             31.6     11-17    140  |  104  |  28<H>  ----------------------------<  72  4.3   |  26  |  1.20    Ca    9.4      17 Nov 2019 06:30  Phos  3.0     11-17  Mg     1.9     11-17      PT/INR - ( 17 Nov 2019 06:30 )   PT: 12.6 SEC;   INR: 1.13              EKG:< from: 12 Lead ECG (11.12.19 @ 14:25) >      Ventricular Rate 92 BPM    Atrial Rate 214 BPM    QRS Duration 82 ms    Q-T Interval 356 ms    QTC Calculation(Bezet) 440 ms    R Axis 58 degrees    T Axis 43 degrees    Diagnosis Line Atrial fibrillation  Anterior infarct , age undetermined        CXR: < from: Xray Chest 2 Views PA/Lat (11.12.19 @ 17:35) >    Clear lungs.  No displaced fractures.        Type and Screen: Type + Screen (11.17.19 @ 05:00)    ABO Interpretation: B    Rh Interpretation: Positive    Antibody Screen: Negative    Urinalysis (11.12.19 @ 16:27)    Color: LT. YELLOW    Urine Appearance: CLEAR    Glucose: NEGATIVE    Bilirubin: NEGATIVE    Ketone - Urine: NEGATIVE    Specific Gravity: 1.003    Blood: NEGATIVE    pH - Urine: 6.5    Protein, Urine: NEGATIVE    Urobilinogen: NORMAL    Nitrite: NEGATIVE    Leukocyte Esterase Concentration: NEGATIVE    Bacteria: FEW          A/P: 87y w/ reducible Left inguinal hernia, asymptomatic    - OR 11/18/19 for Right inguinal hernia repair with Dr. Romero  - NPO past midnight, except medications  - IVF while NPO  - Consent signed and in chart  - Medical clearance for OR Preop Dx: Inguinal Hernia  Surgeon: Dr. Nuñez  Procedure: Right inguinal hernia repair    Vital Signs Last 24 Hrs  T(C): 36.4 (17 Nov 2019 12:01), Max: 36.9 (16 Nov 2019 16:31)  T(F): 97.6 (17 Nov 2019 12:01), Max: 98.4 (16 Nov 2019 16:31)  HR: 82 (17 Nov 2019 12:01) (68 - 99)  BP: 128/76 (17 Nov 2019 12:01) (105/64 - 132/88)  BP(mean): --  RR: 16 (17 Nov 2019 12:01) (16 - 18)  SpO2: 100% (17 Nov 2019 12:01) (98% - 100%)                        9.8    8.03  )-----------( 215      ( 17 Nov 2019 06:30 )             31.6     11-17    140  |  104  |  28<H>  ----------------------------<  72  4.3   |  26  |  1.20    Ca    9.4      17 Nov 2019 06:30  Phos  3.0     11-17  Mg     1.9     11-17      PT/INR - ( 17 Nov 2019 06:30 )   PT: 12.6 SEC;   INR: 1.13              EKG:< from: 12 Lead ECG (11.12.19 @ 14:25) >      Ventricular Rate 92 BPM    Atrial Rate 214 BPM    QRS Duration 82 ms    Q-T Interval 356 ms    QTC Calculation(Bezet) 440 ms    R Axis 58 degrees    T Axis 43 degrees    Diagnosis Line Atrial fibrillation  Anterior infarct , age undetermined        CXR: < from: Xray Chest 2 Views PA/Lat (11.12.19 @ 17:35) >    Clear lungs.  No displaced fractures.        Type and Screen: Type + Screen (11.17.19 @ 05:00)    ABO Interpretation: B    Rh Interpretation: Positive    Antibody Screen: Negative    Urinalysis (11.12.19 @ 16:27)    Color: LT. YELLOW    Urine Appearance: CLEAR    Glucose: NEGATIVE    Bilirubin: NEGATIVE    Ketone - Urine: NEGATIVE    Specific Gravity: 1.003    Blood: NEGATIVE    pH - Urine: 6.5    Protein, Urine: NEGATIVE    Urobilinogen: NORMAL    Nitrite: NEGATIVE    Leukocyte Esterase Concentration: NEGATIVE    Bacteria: FEW          A/P: 87y w/ reducible right inguinal hernia, asymptomatic    - OR 11/18/19 for Right inguinal hernia repair with Dr. Nuñez  - NPO past midnight, except medications  - IVF while NPO  - Consent signed and in chart  - Medical clearance for OR    pt seen and examined  agree with above.  for OR in am with Dr Nuñez. Preop Dx: Inguinal Hernia  Surgeon: Dr. Nuñez  Procedure: Left inguinal hernia repair    Vital Signs Last 24 Hrs  T(C): 36.4 (17 Nov 2019 12:01), Max: 36.9 (16 Nov 2019 16:31)  T(F): 97.6 (17 Nov 2019 12:01), Max: 98.4 (16 Nov 2019 16:31)  HR: 82 (17 Nov 2019 12:01) (68 - 99)  BP: 128/76 (17 Nov 2019 12:01) (105/64 - 132/88)  BP(mean): --  RR: 16 (17 Nov 2019 12:01) (16 - 18)  SpO2: 100% (17 Nov 2019 12:01) (98% - 100%)                        9.8    8.03  )-----------( 215      ( 17 Nov 2019 06:30 )             31.6     11-17    140  |  104  |  28<H>  ----------------------------<  72  4.3   |  26  |  1.20    Ca    9.4      17 Nov 2019 06:30  Phos  3.0     11-17  Mg     1.9     11-17      PT/INR - ( 17 Nov 2019 06:30 )   PT: 12.6 SEC;   INR: 1.13              EKG:< from: 12 Lead ECG (11.12.19 @ 14:25) >      Ventricular Rate 92 BPM    Atrial Rate 214 BPM    QRS Duration 82 ms    Q-T Interval 356 ms    QTC Calculation(Bezet) 440 ms    R Axis 58 degrees    T Axis 43 degrees    Diagnosis Line Atrial fibrillation  Anterior infarct , age undetermined        CXR: < from: Xray Chest 2 Views PA/Lat (11.12.19 @ 17:35) >    Clear lungs.  No displaced fractures.        Type and Screen: Type + Screen (11.17.19 @ 05:00)    ABO Interpretation: B    Rh Interpretation: Positive    Antibody Screen: Negative    Urinalysis (11.12.19 @ 16:27)    Color: LT. YELLOW    Urine Appearance: CLEAR    Glucose: NEGATIVE    Bilirubin: NEGATIVE    Ketone - Urine: NEGATIVE    Specific Gravity: 1.003    Blood: NEGATIVE    pH - Urine: 6.5    Protein, Urine: NEGATIVE    Urobilinogen: NORMAL    Nitrite: NEGATIVE    Leukocyte Esterase Concentration: NEGATIVE    Bacteria: FEW          A/P: 87y w/ reducible left inguinal hernia, asymptomatic    - OR 11/18/19 for left inguinal hernia repair with Dr. Nuñez  - NPO past midnight, except medications  - IVF while NPO  - Consent signed and in chart  - Medical clearance for OR    pt seen and examined  agree with above.  for OR in am with Dr Nuñez.

## 2019-11-17 NOTE — PROGRESS NOTE ADULT - ASSESSMENT
88 yo male with a dementia, CAD on plavix, HTN, HLD, afib, BPH brought in by daughter s/p fall admitted for behavioral disturbance and assistance with placement     Problem/Plan - 1:  ·  Problem: Fall.  Plan: -Pt presents s/p mechanical fall in the setting of aggression. Witnessed fall as per daughter  -With evidence of head trauma as per periorbital ecchymosis and swelling on exam. CT head negative for acute pathology but demonstrates partially visualized healed left facial fracture. There is evidence of internal fixation of a right facial fracture, incompletely seen.  -PT eval in AM.      Problem/Plan - 2:  ·  Problem: Chest pain.  Plan: - Cardiology helping.   -Pt reports reproducible chest pain likely 2/2 recent fall. Denies chest pain with exertion or activity. EKG without acute ST changes.   -CXR without evidence of acute fracture   -Low suspicion for ACS, continue with plavix for h/o of CAD. Pt would be a poor candidate for cardiac intervention in the setting of worsening dementia.      Problem/Plan - 3:  ·  Problem: Aggression.  Plan: - Resolved .   -likely 2/2 to delirium vs progression of dementia. CT head without acute pathology  -no identifiable infectious or metabolic cause. UA neg, CXR neg  -Seroquel 12.5 mg PO q6h as needed as per behavioral health recs last admission. IV haldol 0.5 mg q6 prn if unable to tolerate PO'  Psychiatry help appreciated.      Problem/Plan - 4:  ·  Problem: Dementia.  Plan: -QTc 440, treat agitation with Seroquel 12.5 mg PO q6h as needed as per behavioral health recs last admission. IV haldol .5 mg q6 prn if unable to tolerate PO. In am consider behavioral health c/s for recs regarding long term control of aggression   -SW and CM c/s  -c/w donepezil.      Problem/Plan - 5:  ·  Problem: Atrial fibrillation.  Plan: - Cardiology helping. . Continue with diltiazem for rate control  -off anticoagulation given high risk for falling.      Problem/Plan - 6:  Problem: CAD (coronary artery disease). Plan: -continue with plavix .  -continue with statin.     Problem/Plan - 7:  ·  Problem: BPH (benign prostatic hyperplasia).  Plan: -continue with tamsulosin.      Problem/Plan - 8:  ·  Problem: Hypertension, unspecified type.  Plan: -continue with diltiazem.      Problem/Plan - 9:  ·  Problem: Inguinal hernia   Plan: Surgery was consulted and planning surgery tomorrow .     Medically optimized for the surgery .

## 2019-11-17 NOTE — PROGRESS NOTE ADULT - SUBJECTIVE AND OBJECTIVE BOX
INTERVAL HPI/OVERNIGHT EVENTS: No new concerns .  Vital Signs Last 24 Hrs  T(C): 36.4 (17 Nov 2019 12:01), Max: 36.7 (16 Nov 2019 20:26)  T(F): 97.6 (17 Nov 2019 12:01), Max: 98 (16 Nov 2019 20:26)  HR: 82 (17 Nov 2019 12:01) (68 - 82)  BP: 128/76 (17 Nov 2019 12:01) (105/64 - 128/78)  BP(mean): --  RR: 16 (17 Nov 2019 12:01) (16 - 18)  SpO2: 100% (17 Nov 2019 12:01) (98% - 100%)  I&O's Summary    MEDICATIONS  (STANDING):  atorvastatin 10 milliGRAM(s) Oral at bedtime  diltiazem    milliGRAM(s) Oral daily  donepezil 5 milliGRAM(s) Oral at bedtime  enoxaparin Injectable 40 milliGRAM(s) SubCutaneous daily  lactated ringers. 1000 milliLiter(s) (100 mL/Hr) IV Continuous <Continuous>  melatonin 3 milliGRAM(s) Oral <User Schedule>  multivitamin 1 Tablet(s) Oral daily  risperiDONE   Tablet 0.25 milliGRAM(s) Oral two times a day  tamsulosin 0.4 milliGRAM(s) Oral at bedtime  thiamine 100 milliGRAM(s) Oral daily    MEDICATIONS  (PRN):  haloperidol    Injectable 0.5 milliGRAM(s) IV Push every 6 hours PRN Agitation  QUEtiapine 12.5 milliGRAM(s) Oral every 6 hours PRN Agitation    LABS:                        9.8    8.03  )-----------( 215      ( 17 Nov 2019 06:30 )             31.6     11-17    140  |  104  |  28<H>  ----------------------------<  72  4.3   |  26  |  1.20    Ca    9.4      17 Nov 2019 06:30  Phos  3.0     11-17  Mg     1.9     11-17      PT/INR - ( 17 Nov 2019 06:30 )   PT: 12.6 SEC;   INR: 1.13              CAPILLARY BLOOD GLUCOSE              REVIEW OF SYSTEMS:  CONSTITUTIONAL: No fever, weight loss, or fatigue  EYES: No eye pain, visual disturbances, or discharge  ENMT:  No difficulty hearing, tinnitus, vertigo; No sinus or throat pain  NECK: No pain or stiffness  RESPIRATORY: No cough, wheezing, chills or hemoptysis; No shortness of breath  CARDIOVASCULAR: No chest pain, palpitations, dizziness, or leg swelling  GASTROINTESTINAL: No abdominal or epigastric pain. No nausea, vomiting, or hematemesis; No diarrhea or constipation. No melena or hematochezia.  GENITOURINARY: No dysuria, frequency, hematuria, or incontinence        Consultant(s) Notes Reviewed:  [x ] YES  [ ] NO    PHYSICAL EXAM:  GENERAL: NAD, well-groomed, well-developed, not in any distress ,  HEAD:  Atraumatic, Normocephalic  EYES: EOMI, PERRLA, conjunctiva and sclera clear  ENMT: No tonsillar erythema, exudates, or enlargement; Moist mucous membranes, Good dentition, No lesions  NECK: Supple, No JVD, Normal thyroid  NERVOUS SYSTEM:  Alert & Oriented X2, No focal deficit   CHEST/LUNG: Good air entry bilateral with no  rales, rhonchi, wheezing, or rubs  HEART: Regular rate and rhythm; No murmurs, rubs, or gallops  ABDOMEN: Soft, Nontender, Nondistended; Bowel sounds present  EXTREMITIES:  2+ Peripheral Pulses, No clubbing, cyanosis, or edema      Care Discussed with Consultants/Other Providers [ x] YES  [ ] NO

## 2019-11-18 LAB
ANION GAP SERPL CALC-SCNC: 12 MMO/L — SIGNIFICANT CHANGE UP (ref 7–14)
APTT BLD: 31.3 SEC — SIGNIFICANT CHANGE UP (ref 27.5–36.3)
BASOPHILS # BLD AUTO: 0.05 K/UL — SIGNIFICANT CHANGE UP (ref 0–0.2)
BASOPHILS NFR BLD AUTO: 0.9 % — SIGNIFICANT CHANGE UP (ref 0–2)
BLD GP AB SCN SERPL QL: NEGATIVE — SIGNIFICANT CHANGE UP
BUN SERPL-MCNC: 23 MG/DL — SIGNIFICANT CHANGE UP (ref 7–23)
CALCIUM SERPL-MCNC: 9.3 MG/DL — SIGNIFICANT CHANGE UP (ref 8.4–10.5)
CHLORIDE SERPL-SCNC: 103 MMOL/L — SIGNIFICANT CHANGE UP (ref 98–107)
CO2 SERPL-SCNC: 26 MMOL/L — SIGNIFICANT CHANGE UP (ref 22–31)
CREAT SERPL-MCNC: 1.06 MG/DL — SIGNIFICANT CHANGE UP (ref 0.5–1.3)
EOSINOPHIL # BLD AUTO: 0.18 K/UL — SIGNIFICANT CHANGE UP (ref 0–0.5)
EOSINOPHIL NFR BLD AUTO: 3.3 % — SIGNIFICANT CHANGE UP (ref 0–6)
GLUCOSE SERPL-MCNC: 82 MG/DL — SIGNIFICANT CHANGE UP (ref 70–99)
HCT VFR BLD CALC: 30.2 % — LOW (ref 39–50)
HGB BLD-MCNC: 9.2 G/DL — LOW (ref 13–17)
IMM GRANULOCYTES NFR BLD AUTO: 0.2 % — SIGNIFICANT CHANGE UP (ref 0–1.5)
INR BLD: 1.14 — SIGNIFICANT CHANGE UP (ref 0.88–1.17)
LYMPHOCYTES # BLD AUTO: 0.79 K/UL — LOW (ref 1–3.3)
LYMPHOCYTES # BLD AUTO: 14.4 % — SIGNIFICANT CHANGE UP (ref 13–44)
MAGNESIUM SERPL-MCNC: 1.9 MG/DL — SIGNIFICANT CHANGE UP (ref 1.6–2.6)
MCHC RBC-ENTMCNC: 28.3 PG — SIGNIFICANT CHANGE UP (ref 27–34)
MCHC RBC-ENTMCNC: 30.5 % — LOW (ref 32–36)
MCV RBC AUTO: 92.9 FL — SIGNIFICANT CHANGE UP (ref 80–100)
MONOCYTES # BLD AUTO: 0.51 K/UL — SIGNIFICANT CHANGE UP (ref 0–0.9)
MONOCYTES NFR BLD AUTO: 9.3 % — SIGNIFICANT CHANGE UP (ref 2–14)
NEUTROPHILS # BLD AUTO: 3.93 K/UL — SIGNIFICANT CHANGE UP (ref 1.8–7.4)
NEUTROPHILS NFR BLD AUTO: 71.9 % — SIGNIFICANT CHANGE UP (ref 43–77)
NRBC # FLD: 0 K/UL — SIGNIFICANT CHANGE UP (ref 0–0)
PHOSPHATE SERPL-MCNC: 2.9 MG/DL — SIGNIFICANT CHANGE UP (ref 2.5–4.5)
PLATELET # BLD AUTO: 194 K/UL — SIGNIFICANT CHANGE UP (ref 150–400)
PMV BLD: 10.9 FL — SIGNIFICANT CHANGE UP (ref 7–13)
POTASSIUM SERPL-MCNC: 4.2 MMOL/L — SIGNIFICANT CHANGE UP (ref 3.5–5.3)
POTASSIUM SERPL-SCNC: 4.2 MMOL/L — SIGNIFICANT CHANGE UP (ref 3.5–5.3)
PROTHROM AB SERPL-ACNC: 13.1 SEC — SIGNIFICANT CHANGE UP (ref 9.8–13.1)
RBC # BLD: 3.25 M/UL — LOW (ref 4.2–5.8)
RBC # FLD: 13.3 % — SIGNIFICANT CHANGE UP (ref 10.3–14.5)
RH IG SCN BLD-IMP: POSITIVE — SIGNIFICANT CHANGE UP
SODIUM SERPL-SCNC: 141 MMOL/L — SIGNIFICANT CHANGE UP (ref 135–145)
WBC # BLD: 5.47 K/UL — SIGNIFICANT CHANGE UP (ref 3.8–10.5)
WBC # FLD AUTO: 5.47 K/UL — SIGNIFICANT CHANGE UP (ref 3.8–10.5)

## 2019-11-18 RX ORDER — CLOPIDOGREL BISULFATE 75 MG/1
75 TABLET, FILM COATED ORAL DAILY
Refills: 0 | Status: DISCONTINUED | OUTPATIENT
Start: 2019-11-18 | End: 2019-11-22

## 2019-11-18 RX ORDER — SODIUM CHLORIDE 9 MG/ML
1000 INJECTION, SOLUTION INTRAVENOUS
Refills: 0 | Status: DISCONTINUED | OUTPATIENT
Start: 2019-11-18 | End: 2019-11-19

## 2019-11-18 RX ADMIN — RISPERIDONE 0.25 MILLIGRAM(S): 4 TABLET ORAL at 17:13

## 2019-11-18 RX ADMIN — Medication 1 TABLET(S): at 17:14

## 2019-11-18 RX ADMIN — DONEPEZIL HYDROCHLORIDE 5 MILLIGRAM(S): 10 TABLET, FILM COATED ORAL at 21:50

## 2019-11-18 RX ADMIN — TAMSULOSIN HYDROCHLORIDE 0.4 MILLIGRAM(S): 0.4 CAPSULE ORAL at 21:50

## 2019-11-18 RX ADMIN — SODIUM CHLORIDE 100 MILLILITER(S): 9 INJECTION, SOLUTION INTRAVENOUS at 06:17

## 2019-11-18 RX ADMIN — ATORVASTATIN CALCIUM 10 MILLIGRAM(S): 80 TABLET, FILM COATED ORAL at 21:50

## 2019-11-18 RX ADMIN — Medication 100 MILLIGRAM(S): at 17:14

## 2019-11-18 RX ADMIN — RISPERIDONE 0.25 MILLIGRAM(S): 4 TABLET ORAL at 06:16

## 2019-11-18 RX ADMIN — SODIUM CHLORIDE 100 MILLILITER(S): 9 INJECTION, SOLUTION INTRAVENOUS at 02:29

## 2019-11-18 RX ADMIN — ENOXAPARIN SODIUM 40 MILLIGRAM(S): 100 INJECTION SUBCUTANEOUS at 17:13

## 2019-11-18 RX ADMIN — Medication 3 MILLIGRAM(S): at 21:50

## 2019-11-18 RX ADMIN — CLOPIDOGREL BISULFATE 75 MILLIGRAM(S): 75 TABLET, FILM COATED ORAL at 17:15

## 2019-11-18 RX ADMIN — SODIUM CHLORIDE 75 MILLILITER(S): 9 INJECTION, SOLUTION INTRAVENOUS at 11:16

## 2019-11-18 RX ADMIN — Medication 120 MILLIGRAM(S): at 06:16

## 2019-11-18 NOTE — PROGRESS NOTE ADULT - SUBJECTIVE AND OBJECTIVE BOX
INTERVAL HPI/OVERNIGHT EVENTS: S/P surgery and eating lunch . Daughter in room.   Vital Signs Last 24 Hrs  T(C): 36.4 (18 Nov 2019 20:04), Max: 36.8 (18 Nov 2019 04:00)  T(F): 97.5 (18 Nov 2019 20:04), Max: 98.2 (18 Nov 2019 04:00)  HR: 84 (18 Nov 2019 20:04) (70 - 91)  BP: 134/75 (18 Nov 2019 20:04) (117/72 - 151/81)  BP(mean): 82 (18 Nov 2019 11:00) (76 - 86)  RR: 17 (18 Nov 2019 20:04) (10 - 21)  SpO2: 100% (18 Nov 2019 20:04) (96% - 100%)  I&O's Summary    17 Nov 2019 07:01  -  18 Nov 2019 07:00  --------------------------------------------------------  IN: 500 mL / OUT: 800 mL / NET: -300 mL    18 Nov 2019 07:01  -  18 Nov 2019 21:44  --------------------------------------------------------  IN: 150 mL / OUT: 0 mL / NET: 150 mL      MEDICATIONS  (STANDING):  atorvastatin 10 milliGRAM(s) Oral at bedtime  clopidogrel Tablet 75 milliGRAM(s) Oral daily  diltiazem    milliGRAM(s) Oral daily  donepezil 5 milliGRAM(s) Oral at bedtime  enoxaparin Injectable 40 milliGRAM(s) SubCutaneous daily  lactated ringers. 1000 milliLiter(s) (75 mL/Hr) IV Continuous <Continuous>  melatonin 3 milliGRAM(s) Oral <User Schedule>  multivitamin 1 Tablet(s) Oral daily  risperiDONE   Tablet 0.25 milliGRAM(s) Oral two times a day  tamsulosin 0.4 milliGRAM(s) Oral at bedtime  thiamine 100 milliGRAM(s) Oral daily    MEDICATIONS  (PRN):  haloperidol    Injectable 0.5 milliGRAM(s) IV Push every 6 hours PRN Agitation  QUEtiapine 12.5 milliGRAM(s) Oral every 6 hours PRN Agitation    LABS:                        9.2    5.47  )-----------( 194      ( 18 Nov 2019 06:15 )             30.2     11-18    141  |  103  |  23  ----------------------------<  82  4.2   |  26  |  1.06    Ca    9.3      18 Nov 2019 06:15  Phos  2.9     11-18  Mg     1.9     11-18      PT/INR - ( 18 Nov 2019 06:15 )   PT: 13.1 SEC;   INR: 1.14          PTT - ( 18 Nov 2019 06:15 )  PTT:31.3 SEC    CAPILLARY BLOOD GLUCOSE              REVIEW OF SYSTEMS:  CONSTITUTIONAL: No fever, weight loss, or fatigue  EYES: No eye pain, visual disturbances, or discharge  ENMT:  No difficulty hearing, tinnitus, vertigo; No sinus or throat pain  NECK: No pain or stiffness  BREASTS: No pain, masses, or nipple discharge  RESPIRATORY: No cough, wheezing, chills or hemoptysis; No shortness of breath  CARDIOVASCULAR: No chest pain, palpitations, dizziness, or leg swelling  GASTROINTESTINAL: No abdominal or epigastric pain. No nausea, vomiting, or hematemesis; No diarrhea or constipation. No melena or hematochezia.    Consultant(s) Notes Reviewed:  [x ] YES  [ ] NO    PHYSICAL EXAM:  GENERAL: NAD, well-groomed, well-developed,not in any distress ,  HEAD:  Atraumatic, Normocephalic  EYES: EOMI, PERRLA, conjunctiva and sclera clear  ENMT: No tonsillar erythema, exudates, or enlargement; Moist mucous membranes, Good dentition, No lesions  NECK: Supple, No JVD, Normal thyroid  NERVOUS SYSTEM:  Alert & Oriented X2, No focal deficit   CHEST/LUNG: Good air entry bilateral with no  rales, rhonchi, wheezing, or rubs  HEART: Regular rate and rhythm; No murmurs, rubs, or gallops  ABDOMEN: Soft, Nontender, Nondistended; Bowel sounds present  EXTREMITIES:  2+ Peripheral Pulses, No clubbing, cyanosis, or edema    Care Discussed with Consultants/Other Providers [ x] YES  [ ] NO

## 2019-11-18 NOTE — PROGRESS NOTE ADULT - ASSESSMENT
86 yo male with a dementia, CAD on plavix, HTN, HLD, afib, BPH brought in by daughter s/p fall admitted for behavioral disturbance and assistance with placement     Problem/Plan - 1:  ·  Problem: Fall.  Plan: -Pt presents s/p mechanical fall in the setting of aggression. Witnessed fall as per daughter  -With evidence of head trauma as per periorbital ecchymosis and swelling on exam. CT head negative for acute pathology but demonstrates partially visualized healed left facial fracture. There is evidence of internal fixation of a right facial fracture, incompletely seen.  -PT eval in AM.      Problem/Plan - 2:  ·  Problem: Chest pain.  Plan: - Cardiology helping.   -Pt reports reproducible chest pain likely 2/2 recent fall. Denies chest pain with exertion or activity. EKG without acute ST changes.   -CXR without evidence of acute fracture   -Low suspicion for ACS, continue with plavix for h/o of CAD. Pt would be a poor candidate for cardiac intervention in the setting of worsening dementia.      Problem/Plan - 3:  ·  Problem: Aggression.  Plan: - Resolved .   -likely 2/2 to delirium vs progression of dementia. CT head without acute pathology  -no identifiable infectious or metabolic cause. UA neg, CXR neg  -Seroquel 12.5 mg PO q6h as needed as per behavioral health recs last admission. IV haldol 0.5 mg q6 prn if unable to tolerate PO'  Psychiatry help appreciated.      Problem/Plan - 4:  ·  Problem: Dementia.  Plan: -QTc 440, treat agitation with Seroquel 12.5 mg PO q6h as needed as per behavioral health recs last admission. IV haldol .5 mg q6 prn if unable to tolerate PO. In am consider behavioral health c/s for recs regarding long term control of aggression   -SW and CM c/s  -c/w donepezil.      Problem/Plan - 5:  ·  Problem: Atrial fibrillation.  Plan: - Cardiology helping. . Continue with diltiazem for rate control  -off anticoagulation given high risk for falling.      Problem/Plan - 6:  Problem: CAD (coronary artery disease). Plan: -continue with plavix .  -continue with statin.     Problem/Plan - 7:  ·  Problem: BPH (benign prostatic hyperplasia).  Plan: -continue with tamsulosin.      Problem/Plan - 8:  ·  Problem: Hypertension, unspecified type.  Plan: -continue with diltiazem.      Problem/Plan - 9:  ·  Problem: Inguinal hernia   Plan: Surgery was consulted and S/P Hernia repair . Surgery helping.      Disposition : DC planning to [facility].

## 2019-11-18 NOTE — PROGRESS NOTE ADULT - SUBJECTIVE AND OBJECTIVE BOX
Cardiovascular Disease Progress Note    Overnight events: No acute events overnight. Mr. Crowe is sitting up in bed. He denies chest pain or SOB.    Otherwise review of systems negative    Objective Findings:  T(C): 36.8 (11-18-19 @ 04:00), Max: 36.8 (11-18-19 @ 04:00)  HR: 81 (11-18-19 @ 04:00) (80 - 84)  BP: 132/84 (11-18-19 @ 04:00) (120/80 - 132/84)  RR: 17 (11-18-19 @ 04:00) (16 - 17)  SpO2: 99% (11-18-19 @ 04:00) (99% - 100%)  Wt(kg): --  Daily     Daily       Physical Exam:  Gen: NAD; Patient resting comfortably  HEENT: EOMI, Normocephalic/ atraumatic  CV: RRR, normal S1 + S2, no m/r/g  Lungs:  Normal respiratory effort; clear to auscultation bilaterally  Abd: soft, non-tender; bowel sounds present  Ext: No edema; warm and well perfused    Telemetry: n/a    Laboratory Data:                        9.2    5.47  )-----------( 194      ( 18 Nov 2019 06:15 )             30.2     11-18    141  |  103  |  23  ----------------------------<  82  4.2   |  26  |  1.06    Ca    9.3      18 Nov 2019 06:15  Phos  2.9     11-18  Mg     1.9     11-18      PT/INR - ( 18 Nov 2019 06:15 )   PT: 13.1 SEC;   INR: 1.14          PTT - ( 18 Nov 2019 06:15 )  PTT:31.3 SEC          Inpatient Medications:  MEDICATIONS  (STANDING):  atorvastatin 10 milliGRAM(s) Oral at bedtime  diltiazem    milliGRAM(s) Oral daily  donepezil 5 milliGRAM(s) Oral at bedtime  enoxaparin Injectable 40 milliGRAM(s) SubCutaneous daily  lactated ringers. 1000 milliLiter(s) (100 mL/Hr) IV Continuous <Continuous>  melatonin 3 milliGRAM(s) Oral <User Schedule>  multivitamin 1 Tablet(s) Oral daily  risperiDONE   Tablet 0.25 milliGRAM(s) Oral two times a day  tamsulosin 0.4 milliGRAM(s) Oral at bedtime  thiamine 100 milliGRAM(s) Oral daily      Assessment: 87 year old man with dementia, reported CAD on plavix, HTN, HLD, and a-fib not on AC presents with mechanical fall and aggression.    Plan of Care:    #Pre-operative evaluation-  Mr. Crowe displays no signs or symptoms of coronary ischemia or decompensated CHF.   Echocardiogram from 7/30/2019 reviewed- normal LV systolic function with no significant valvular disease.  Patient is optimized from a cardiac standpoint to proceed with L inguinal hernia surgery.     #A-fib-  Rate controlled on cardizem.  Would hold off on AC given frequent falls.     #CAD-  EKG is a-fib without ischemic changes.  Recent echo with preserved LVEF.  May hold Plavix for upcoming surgery.   Continue with statin.    #HTN-  BP acceptable on current regimen.         Over 25 minutes spent on total encounter; more than 50% of the visit was spent counseling and/or coordinating care by the attending physician.      Ronald Williamson MD [facility]  Cardiovascular Disease  [phone]

## 2019-11-19 LAB
ANION GAP SERPL CALC-SCNC: 11 MMO/L — SIGNIFICANT CHANGE UP (ref 7–14)
BUN SERPL-MCNC: 22 MG/DL — SIGNIFICANT CHANGE UP (ref 7–23)
CALCIUM SERPL-MCNC: 9.4 MG/DL — SIGNIFICANT CHANGE UP (ref 8.4–10.5)
CHLORIDE SERPL-SCNC: 104 MMOL/L — SIGNIFICANT CHANGE UP (ref 98–107)
CO2 SERPL-SCNC: 25 MMOL/L — SIGNIFICANT CHANGE UP (ref 22–31)
CREAT SERPL-MCNC: 1.03 MG/DL — SIGNIFICANT CHANGE UP (ref 0.5–1.3)
GLUCOSE SERPL-MCNC: 81 MG/DL — SIGNIFICANT CHANGE UP (ref 70–99)
HCT VFR BLD CALC: 33.5 % — LOW (ref 39–50)
HGB BLD-MCNC: 10.3 G/DL — LOW (ref 13–17)
MAGNESIUM SERPL-MCNC: 1.9 MG/DL — SIGNIFICANT CHANGE UP (ref 1.6–2.6)
MCHC RBC-ENTMCNC: 28.8 PG — SIGNIFICANT CHANGE UP (ref 27–34)
MCHC RBC-ENTMCNC: 30.7 % — LOW (ref 32–36)
MCV RBC AUTO: 93.6 FL — SIGNIFICANT CHANGE UP (ref 80–100)
NRBC # FLD: 0 K/UL — SIGNIFICANT CHANGE UP (ref 0–0)
PLATELET # BLD AUTO: 211 K/UL — SIGNIFICANT CHANGE UP (ref 150–400)
PMV BLD: 11.1 FL — SIGNIFICANT CHANGE UP (ref 7–13)
POTASSIUM SERPL-MCNC: 4.9 MMOL/L — SIGNIFICANT CHANGE UP (ref 3.5–5.3)
POTASSIUM SERPL-SCNC: 4.9 MMOL/L — SIGNIFICANT CHANGE UP (ref 3.5–5.3)
RBC # BLD: 3.58 M/UL — LOW (ref 4.2–5.8)
RBC # FLD: 13.3 % — SIGNIFICANT CHANGE UP (ref 10.3–14.5)
SODIUM SERPL-SCNC: 140 MMOL/L — SIGNIFICANT CHANGE UP (ref 135–145)
WBC # BLD: 7.9 K/UL — SIGNIFICANT CHANGE UP (ref 3.8–10.5)
WBC # FLD AUTO: 7.9 K/UL — SIGNIFICANT CHANGE UP (ref 3.8–10.5)

## 2019-11-19 RX ADMIN — TAMSULOSIN HYDROCHLORIDE 0.4 MILLIGRAM(S): 0.4 CAPSULE ORAL at 21:13

## 2019-11-19 RX ADMIN — RISPERIDONE 0.25 MILLIGRAM(S): 4 TABLET ORAL at 17:30

## 2019-11-19 RX ADMIN — QUETIAPINE FUMARATE 12.5 MILLIGRAM(S): 200 TABLET, FILM COATED ORAL at 11:49

## 2019-11-19 RX ADMIN — Medication 3 MILLIGRAM(S): at 21:13

## 2019-11-19 RX ADMIN — RISPERIDONE 0.25 MILLIGRAM(S): 4 TABLET ORAL at 05:43

## 2019-11-19 RX ADMIN — CLOPIDOGREL BISULFATE 75 MILLIGRAM(S): 75 TABLET, FILM COATED ORAL at 11:49

## 2019-11-19 RX ADMIN — Medication 100 MILLIGRAM(S): at 11:49

## 2019-11-19 RX ADMIN — ENOXAPARIN SODIUM 40 MILLIGRAM(S): 100 INJECTION SUBCUTANEOUS at 11:49

## 2019-11-19 RX ADMIN — ATORVASTATIN CALCIUM 10 MILLIGRAM(S): 80 TABLET, FILM COATED ORAL at 21:13

## 2019-11-19 RX ADMIN — DONEPEZIL HYDROCHLORIDE 5 MILLIGRAM(S): 10 TABLET, FILM COATED ORAL at 21:13

## 2019-11-19 RX ADMIN — Medication 120 MILLIGRAM(S): at 05:49

## 2019-11-19 RX ADMIN — Medication 1 TABLET(S): at 11:49

## 2019-11-19 NOTE — PHYSICAL THERAPY INITIAL EVALUATION ADULT - PERTINENT HX OF CURRENT PROBLEM, REHAB EVAL
Per documentation, pt. admitted s/p mechanical fall in the setting of aggression. Per radiology report, CT head revealed no mass effect, hemorrhage or evidence of acute intracranial pathology.
Per documentation, pt. admitted s/p mechanical fall in the setting of aggression. Per radiology report, CT head revealed no mass effect, hemorrhage or evidence of acute intracranial pathology. Pt. now s/p left inguinal hernia repair POD#1.

## 2019-11-19 NOTE — PROGRESS NOTE ADULT - ASSESSMENT
86 yo male with a dementia, CAD on plavix, HTN, HLD, afib, BPH brought in by daughter s/p fall admitted for behavioral disturbance and assistance with placement     Problem/Plan - 1:  ·  Problem: Fall.  Plan: -Pt presents s/p mechanical fall in the setting of aggression. Witnessed fall as per daughter  -With evidence of head trauma as per periorbital ecchymosis and swelling on exam. CT head negative for acute pathology but demonstrates partially visualized healed left facial fracture. There is evidence of internal fixation of a right facial fracture, incompletely seen.  -PT eval in AM.      Problem/Plan - 2:  ·  Problem: Chest pain.  Plan: - Cardiology helping.   -Pt reports reproducible chest pain likely 2/2 recent fall. Denies chest pain with exertion or activity. EKG without acute ST changes.   -CXR without evidence of acute fracture   -Low suspicion for ACS, continue with plavix for h/o of CAD. Pt would be a poor candidate for cardiac intervention in the setting of worsening dementia.      Problem/Plan - 3:  ·  Problem: Aggression.  Plan: - Resolved .   -likely 2/2 to delirium vs progression of dementia. CT head without acute pathology  -no identifiable infectious or metabolic cause. UA neg, CXR neg  -Seroquel 12.5 mg PO q6h as needed as per behavioral health recs last admission. IV haldol 0.5 mg q6 prn if unable to tolerate PO'  Psychiatry help appreciated.      Problem/Plan - 4:  ·  Problem: Dementia.  Plan: -QTc 440, treat agitation with Seroquel 12.5 mg PO q6h as needed as per behavioral health recs last admission. IV haldol .5 mg q6 prn if unable to tolerate PO. In am consider behavioral health c/s for recs regarding long term control of aggression   -SW and CM c/s  -c/w donepezil.      Problem/Plan - 5:  ·  Problem: Atrial fibrillation.  Plan: - Cardiology helping. . Continue with diltiazem for rate control  -off anticoagulation given high risk for falling.      Problem/Plan - 6:  Problem: CAD (coronary artery disease). Plan: -continue with plavix .  -continue with statin.     Problem/Plan - 7:  ·  Problem: BPH (benign prostatic hyperplasia).  Plan: -continue with tamsulosin.      Problem/Plan - 8:  ·  Problem: Hypertension, unspecified type.  Plan: -continue with diltiazem.      Problem/Plan - 9:  ·  Problem: Inguinal hernia   Plan: Surgery was consulted and S/P Hernia repair . Surgery helping.      Disposition : DC planning to [facility] as Medically stable . Surgery cleared.

## 2019-11-19 NOTE — PROGRESS NOTE ADULT - SUBJECTIVE AND OBJECTIVE BOX
INTERVAL HPI/OVERNIGHT EVENTS: No new concerns.   Vital Signs Last 24 Hrs  T(C): 36.3 (19 Nov 2019 05:52), Max: 36.4 (18 Nov 2019 20:04)  T(F): 97.4 (19 Nov 2019 05:52), Max: 97.6 (19 Nov 2019 04:01)  HR: 86 (19 Nov 2019 05:52) (80 - 86)  BP: 130/81 (19 Nov 2019 05:52) (128/76 - 134/75)  BP(mean): --  RR: 17 (19 Nov 2019 05:52) (17 - 18)  SpO2: 99% (19 Nov 2019 05:52) (99% - 100%)  I&O's Summary    18 Nov 2019 07:01  -  19 Nov 2019 07:00  --------------------------------------------------------  IN: 150 mL / OUT: 0 mL / NET: 150 mL      MEDICATIONS  (STANDING):  atorvastatin 10 milliGRAM(s) Oral at bedtime  clopidogrel Tablet 75 milliGRAM(s) Oral daily  diltiazem    milliGRAM(s) Oral daily  donepezil 5 milliGRAM(s) Oral at bedtime  enoxaparin Injectable 40 milliGRAM(s) SubCutaneous daily  lactated ringers. 1000 milliLiter(s) (75 mL/Hr) IV Continuous <Continuous>  melatonin 3 milliGRAM(s) Oral <User Schedule>  multivitamin 1 Tablet(s) Oral daily  risperiDONE   Tablet 0.25 milliGRAM(s) Oral two times a day  tamsulosin 0.4 milliGRAM(s) Oral at bedtime  thiamine 100 milliGRAM(s) Oral daily    MEDICATIONS  (PRN):  haloperidol    Injectable 0.5 milliGRAM(s) IV Push every 6 hours PRN Agitation  QUEtiapine 12.5 milliGRAM(s) Oral every 6 hours PRN Agitation    LABS:                        10.3   7.90  )-----------( 211      ( 19 Nov 2019 06:09 )             33.5     11-19    140  |  104  |  22  ----------------------------<  81  4.9   |  25  |  1.03    Ca    9.4      19 Nov 2019 06:09  Phos  2.9     11-18  Mg     1.9     11-19      PT/INR - ( 18 Nov 2019 06:15 )   PT: 13.1 SEC;   INR: 1.14          PTT - ( 18 Nov 2019 06:15 )  PTT:31.3 SEC    CAPILLARY BLOOD GLUCOSE              REVIEW OF SYSTEMS:  CONSTITUTIONAL: No fever, weight loss, or fatigue  EYES: No eye pain, visual disturbances, or discharge  ENMT:  No difficulty hearing, tinnitus, vertigo; No sinus or throat pain  RESPIRATORY: No cough, wheezing, chills or hemoptysis; No shortness of breath  CARDIOVASCULAR: No chest pain, palpitations, dizziness, or leg swelling  GASTROINTESTINAL: No abdominal or epigastric pain. No nausea, vomiting, or hematemesis; No diarrhea or constipation. No melena or hematochezia.  GENITOURINARY: No dysuria, frequency, hematuria, or incontinence    Consultant(s) Notes Reviewed:  [x ] YES  [ ] NO    PHYSICAL EXAM:  GENERAL: NAD, well-groomed, well-developed,not in any distress ,  HEAD:  Atraumatic, Normocephalic  EYES: EOMI, PERRLA, conjunctiva and sclera clear  ENMT: No tonsillar erythema, exudates, or enlargement; Moist mucous membranes, Good dentition, No lesions  NECK: Supple, No JVD, Normal thyroid  NERVOUS SYSTEM:  Alert & Oriented X2, No focal deficit   CHEST/LUNG: Good air entry bilateral with no  rales, rhonchi, wheezing, or rubs  HEART: Regular rate and rhythm; No murmurs, rubs, or gallops  ABDOMEN: Soft, Nontender, Nondistended; Bowel sounds present  EXTREMITIES:  2+ Peripheral Pulses, No clubbing, cyanosis, or edema  SKIN: No rashes or lesions    Care Discussed with Consultants/Other Providers [ x] YES  [ ] NO

## 2019-11-19 NOTE — PHYSICAL THERAPY INITIAL EVALUATION ADULT - PHYSICAL ASSIST/NONPHYSICAL ASSIST: STAND/SIT, REHAB EVAL
1 person assist/nonverbal cues (demo/gestures)/verbal cues
1 person assist/verbal cues/nonverbal cues (demo/gestures)

## 2019-11-19 NOTE — PHYSICAL THERAPY INITIAL EVALUATION ADULT - RANGE OF MOTION EXAMINATION, REHAB EVAL
bilateral lower extremity ROM was WFL (within functional limits)/bilateral upper extremity ROM was WFL (within functional limits)
bilateral upper extremity ROM was WFL (within functional limits)/bilateral lower extremity ROM was WFL (within functional limits)

## 2019-11-19 NOTE — PHYSICAL THERAPY INITIAL EVALUATION ADULT - ADDITIONAL COMMENTS
Social history and previous level of function obtained from PT Evaluation on 11/13/19. Pt. reports owning DME of straight cane.    Pt. was left supine in bed post PT Re-Evaluation, no apparent distress, +bed alarm.
Pt. reports owning DME of straight cane.    Pt. was left supine in bed post PT Evaluation, no apparent distress. RN Dante made aware of pt. status and participation in PT.

## 2019-11-19 NOTE — PHYSICAL THERAPY INITIAL EVALUATION ADULT - CRITERIA FOR SKILLED THERAPEUTIC INTERVENTIONS
anticipated discharge recommendation/impairments found
rehab potential/predicted duration of therapy intervention/impairments found/therapy frequency/anticipated discharge recommendation

## 2019-11-19 NOTE — PROGRESS NOTE ADULT - ASSESSMENT
87M POD#1 s/p LIHR with mesh, recovering well. Groin appropriately tender, no palpable hematomas, dressing c/d/i.    Pt seen and examined with Dr. Elam  - Reg diet  - Pain control   - Ice for swelling  - keep steri strips in place  - f/u with Dr. Nuñez in the office in 7-10 days  - signing off, please reconsult at any time

## 2019-11-19 NOTE — PROGRESS NOTE ADULT - SUBJECTIVE AND OBJECTIVE BOX
INTERVAL HPI/OVERNIGHT EVENTS: Pt seen and examined. Resting comfortably, pain managed.    STATUS POST:  LIHR with mesh    POST OPERATIVE DAY #: 1    MEDICATIONS  (STANDING):  atorvastatin 10 milliGRAM(s) Oral at bedtime  clopidogrel Tablet 75 milliGRAM(s) Oral daily  diltiazem    milliGRAM(s) Oral daily  donepezil 5 milliGRAM(s) Oral at bedtime  enoxaparin Injectable 40 milliGRAM(s) SubCutaneous daily  lactated ringers. 1000 milliLiter(s) (75 mL/Hr) IV Continuous <Continuous>  melatonin 3 milliGRAM(s) Oral <User Schedule>  multivitamin 1 Tablet(s) Oral daily  risperiDONE   Tablet 0.25 milliGRAM(s) Oral two times a day  tamsulosin 0.4 milliGRAM(s) Oral at bedtime  thiamine 100 milliGRAM(s) Oral daily    MEDICATIONS  (PRN):  haloperidol    Injectable 0.5 milliGRAM(s) IV Push every 6 hours PRN Agitation  QUEtiapine 12.5 milliGRAM(s) Oral every 6 hours PRN Agitation      Vital Signs Last 24 Hrs  T(C): 36.3 (19 Nov 2019 05:52), Max: 36.4 (18 Nov 2019 10:40)  T(F): 97.4 (19 Nov 2019 05:52), Max: 97.6 (19 Nov 2019 04:01)  HR: 86 (19 Nov 2019 05:52) (70 - 86)  BP: 130/81 (19 Nov 2019 05:52) (117/72 - 134/75)  BP(mean): 82 (18 Nov 2019 11:00) (76 - 86)  RR: 17 (19 Nov 2019 05:52) (10 - 21)  SpO2: 99% (19 Nov 2019 05:52) (96% - 100%)    PHYSICAL EXAM:      Constitutional: NAD  Gastrointestinal: Left groin mildly full, soft, no hematomas, ATTP. Dressing c/d/i          I&O's Detail    18 Nov 2019 07:01  -  19 Nov 2019 07:00  --------------------------------------------------------  IN:    Oral Fluid: 150 mL  Total IN: 150 mL    OUT:  Total OUT: 0 mL    Total NET: 150 mL          LABS:                        10.3   7.90  )-----------( 211      ( 19 Nov 2019 06:09 )             33.5     11-19    140  |  104  |  22  ----------------------------<  81  4.9   |  25  |  1.03    Ca    9.4      19 Nov 2019 06:09  Phos  2.9     11-18  Mg     1.9     11-19      PT/INR - ( 18 Nov 2019 06:15 )   PT: 13.1 SEC;   INR: 1.14          PTT - ( 18 Nov 2019 06:15 )  PTT:31.3 SEC      RADIOLOGY & ADDITIONAL STUDIES:

## 2019-11-19 NOTE — PHYSICAL THERAPY INITIAL EVALUATION ADULT - GENERAL OBSERVATIONS, REHAB EVAL
Consult received, chart reviewed. Patient received supine in bed, no apparent distress. Patient agreed to Evaluation from Physical Therapist.
Consult received, chart reviewed. Patient received supine in bed, no apparent distress. Patient agreed to Re-Evaluation from Physical Therapist.

## 2019-11-19 NOTE — PHYSICAL THERAPY INITIAL EVALUATION ADULT - DISCHARGE DISPOSITION, PT EVAL
Home with no skilled PT needs. Pt. presents without gross impairment and appears at baseline level of function. Skilled, therapeutic PT intervention not indicated at this time. Pt. will be discharged from PT program.
Home with no skilled PT needs.

## 2019-11-19 NOTE — PROGRESS NOTE ADULT - SUBJECTIVE AND OBJECTIVE BOX
Cardiovascular Disease Progress Note    Overnight events: No acute events overnight. Mr. Crowe denies chest pain or SOB.    Otherwise review of systems negative    Objective Findings:  T(C): 36.3 (11-19-19 @ 05:52), Max: 36.4 (11-18-19 @ 10:40)  HR: 86 (11-19-19 @ 05:52) (70 - 91)  BP: 130/81 (11-19-19 @ 05:52) (117/72 - 151/81)  RR: 17 (11-19-19 @ 05:52) (10 - 21)  SpO2: 99% (11-19-19 @ 05:52) (96% - 100%)  Wt(kg): --  Daily Height in cm: 193 (18 Nov 2019 08:25)    Daily       Physical Exam:  Gen: NAD; Patient resting comfortably  HEENT: EOMI, Normocephalic/ atraumatic  CV: RRR, normal S1 + S2, no m/r/g  Lungs:  Normal respiratory effort; clear to auscultation bilaterally  Abd: soft, non-tender; bowel sounds present  Ext: No edema; warm and well perfused    Telemetry: n/a    Laboratory Data:                        10.3   7.90  )-----------( 211      ( 19 Nov 2019 06:09 )             33.5     11-19    140  |  104  |  22  ----------------------------<  81  4.9   |  25  |  1.03    Ca    9.4      19 Nov 2019 06:09  Phos  2.9     11-18  Mg     1.9     11-19      PT/INR - ( 18 Nov 2019 06:15 )   PT: 13.1 SEC;   INR: 1.14          PTT - ( 18 Nov 2019 06:15 )  PTT:31.3 SEC          Inpatient Medications:  MEDICATIONS  (STANDING):  atorvastatin 10 milliGRAM(s) Oral at bedtime  clopidogrel Tablet 75 milliGRAM(s) Oral daily  diltiazem    milliGRAM(s) Oral daily  donepezil 5 milliGRAM(s) Oral at bedtime  enoxaparin Injectable 40 milliGRAM(s) SubCutaneous daily  lactated ringers. 1000 milliLiter(s) (75 mL/Hr) IV Continuous <Continuous>  melatonin 3 milliGRAM(s) Oral <User Schedule>  multivitamin 1 Tablet(s) Oral daily  risperiDONE   Tablet 0.25 milliGRAM(s) Oral two times a day  tamsulosin 0.4 milliGRAM(s) Oral at bedtime  thiamine 100 milliGRAM(s) Oral daily      Assessment: 87 year old man with dementia, reported CAD on plavix, HTN, HLD, and a-fib not on AC presents with mechanical fall and aggression.    Plan of Care:    #Pre-operative evaluation-  Mr. Crowe tolerated L inguinal surgery well on 11/18.  He displays no signs or symptoms of post-op coronary ischemia or decompensated CHF.   Echocardiogram from 7/30/2019 reviewed- normal LV systolic function with no significant valvular disease.  Continue current cardiac management.     #A-fib-  Rate controlled on cardizem.  Would hold off on AC given frequent falls.     #CAD-  EKG is a-fib without ischemic changes.  Recent echo with preserved LVEF.  Continue with statin and Plavix.     #HTN-  BP acceptable on current regimen.         Over 25 minutes spent on total encounter; more than 50% of the visit was spent counseling and/or coordinating care by the attending physician.      Ronald Williamson MD [facility]  Cardiovascular Disease  [phone]

## 2019-11-19 NOTE — PHYSICAL THERAPY INITIAL EVALUATION ADULT - LIVES WITH, PROFILE
Lives in an apartment with daughter. Elevator access inside.
Lives in an apartment with daughter. Elevator access inside.

## 2019-11-19 NOTE — PHYSICAL THERAPY INITIAL EVALUATION ADULT - FOLLOWS COMMANDS/ANSWERS QUESTIONS, REHAB EVAL
100% of the time/able to follow single-step instructions
100% of the time/able to follow single-step instructions

## 2019-11-20 LAB
ANION GAP SERPL CALC-SCNC: 15 MMO/L — HIGH (ref 7–14)
APPEARANCE UR: CLEAR — SIGNIFICANT CHANGE UP
BACTERIA # UR AUTO: NEGATIVE — SIGNIFICANT CHANGE UP
BILIRUB UR-MCNC: NEGATIVE — SIGNIFICANT CHANGE UP
BLOOD UR QL VISUAL: NEGATIVE — SIGNIFICANT CHANGE UP
BUN SERPL-MCNC: 23 MG/DL — SIGNIFICANT CHANGE UP (ref 7–23)
CALCIUM SERPL-MCNC: 9.4 MG/DL — SIGNIFICANT CHANGE UP (ref 8.4–10.5)
CHLORIDE SERPL-SCNC: 103 MMOL/L — SIGNIFICANT CHANGE UP (ref 98–107)
CO2 SERPL-SCNC: 21 MMOL/L — LOW (ref 22–31)
COLOR SPEC: YELLOW — SIGNIFICANT CHANGE UP
CREAT SERPL-MCNC: 1.02 MG/DL — SIGNIFICANT CHANGE UP (ref 0.5–1.3)
GLUCOSE SERPL-MCNC: 86 MG/DL — SIGNIFICANT CHANGE UP (ref 70–99)
GLUCOSE UR-MCNC: NEGATIVE — SIGNIFICANT CHANGE UP
HCT VFR BLD CALC: 31.9 % — LOW (ref 39–50)
HGB BLD-MCNC: 9.9 G/DL — LOW (ref 13–17)
HYALINE CASTS # UR AUTO: NEGATIVE — SIGNIFICANT CHANGE UP
KETONES UR-MCNC: NEGATIVE — SIGNIFICANT CHANGE UP
LEUKOCYTE ESTERASE UR-ACNC: NEGATIVE — SIGNIFICANT CHANGE UP
MAGNESIUM SERPL-MCNC: 2 MG/DL — SIGNIFICANT CHANGE UP (ref 1.6–2.6)
MCHC RBC-ENTMCNC: 28.3 PG — SIGNIFICANT CHANGE UP (ref 27–34)
MCHC RBC-ENTMCNC: 31 % — LOW (ref 32–36)
MCV RBC AUTO: 91.1 FL — SIGNIFICANT CHANGE UP (ref 80–100)
NITRITE UR-MCNC: NEGATIVE — SIGNIFICANT CHANGE UP
NRBC # FLD: 0 K/UL — SIGNIFICANT CHANGE UP (ref 0–0)
PH UR: 6 — SIGNIFICANT CHANGE UP (ref 5–8)
PHOSPHATE SERPL-MCNC: 3.2 MG/DL — SIGNIFICANT CHANGE UP (ref 2.5–4.5)
PLATELET # BLD AUTO: 172 K/UL — SIGNIFICANT CHANGE UP (ref 150–400)
PMV BLD: 10.8 FL — SIGNIFICANT CHANGE UP (ref 7–13)
POTASSIUM SERPL-MCNC: 4.6 MMOL/L — SIGNIFICANT CHANGE UP (ref 3.5–5.3)
POTASSIUM SERPL-SCNC: 4.6 MMOL/L — SIGNIFICANT CHANGE UP (ref 3.5–5.3)
PROT UR-MCNC: 20 — SIGNIFICANT CHANGE UP
RBC # BLD: 3.5 M/UL — LOW (ref 4.2–5.8)
RBC # FLD: 13.2 % — SIGNIFICANT CHANGE UP (ref 10.3–14.5)
RBC CASTS # UR COMP ASSIST: SIGNIFICANT CHANGE UP (ref 0–?)
SODIUM SERPL-SCNC: 139 MMOL/L — SIGNIFICANT CHANGE UP (ref 135–145)
SP GR SPEC: 1.03 — SIGNIFICANT CHANGE UP (ref 1–1.04)
SQUAMOUS # UR AUTO: SIGNIFICANT CHANGE UP
UROBILINOGEN FLD QL: HIGH
WBC # BLD: 8.55 K/UL — SIGNIFICANT CHANGE UP (ref 3.8–10.5)
WBC # FLD AUTO: 8.55 K/UL — SIGNIFICANT CHANGE UP (ref 3.8–10.5)
WBC UR QL: SIGNIFICANT CHANGE UP (ref 0–?)

## 2019-11-20 RX ADMIN — TAMSULOSIN HYDROCHLORIDE 0.4 MILLIGRAM(S): 0.4 CAPSULE ORAL at 21:43

## 2019-11-20 RX ADMIN — Medication 120 MILLIGRAM(S): at 05:52

## 2019-11-20 RX ADMIN — Medication 100 MILLIGRAM(S): at 12:04

## 2019-11-20 RX ADMIN — Medication 3 MILLIGRAM(S): at 21:43

## 2019-11-20 RX ADMIN — ATORVASTATIN CALCIUM 10 MILLIGRAM(S): 80 TABLET, FILM COATED ORAL at 21:44

## 2019-11-20 RX ADMIN — Medication 1 TABLET(S): at 12:04

## 2019-11-20 RX ADMIN — RISPERIDONE 0.25 MILLIGRAM(S): 4 TABLET ORAL at 05:52

## 2019-11-20 RX ADMIN — RISPERIDONE 0.25 MILLIGRAM(S): 4 TABLET ORAL at 17:56

## 2019-11-20 RX ADMIN — ENOXAPARIN SODIUM 40 MILLIGRAM(S): 100 INJECTION SUBCUTANEOUS at 12:04

## 2019-11-20 RX ADMIN — DONEPEZIL HYDROCHLORIDE 5 MILLIGRAM(S): 10 TABLET, FILM COATED ORAL at 21:43

## 2019-11-20 RX ADMIN — CLOPIDOGREL BISULFATE 75 MILLIGRAM(S): 75 TABLET, FILM COATED ORAL at 12:04

## 2019-11-20 NOTE — PROGRESS NOTE ADULT - SUBJECTIVE AND OBJECTIVE BOX
Cardiovascular Disease Progress Note    Overnight events: No acute events overnight.  Mr. Crowe denies chest pain or SOB.   Otherwise review of systems negative    Objective Findings:  T(C): 36.8 (11-20-19 @ 05:51), Max: 36.8 (11-19-19 @ 14:18)  HR: 94 (11-20-19 @ 05:51) (90 - 105)  BP: 123/76 (11-20-19 @ 05:51) (109/66 - 123/76)  RR: 17 (11-20-19 @ 05:51) (17 - 17)  SpO2: 98% (11-20-19 @ 05:51) (98% - 100%)  Wt(kg): --  Daily     Daily       Physical Exam:  Gen: NAD; Patient resting comfortably  HEENT: EOMI, Normocephalic/ atraumatic  CV: RRR, normal S1 + S2, no m/r/g  Lungs:  Normal respiratory effort; clear to auscultation bilaterally  Abd: soft, non-tender; bowel sounds present  Ext: No edema; warm and well perfused    Telemetry: n/a    Laboratory Data:                        9.9    8.55  )-----------( 172      ( 20 Nov 2019 05:20 )             31.9     11-20    139  |  103  |  23  ----------------------------<  86  4.6   |  21<L>  |  1.02    Ca    9.4      20 Nov 2019 05:20  Phos  3.2     11-20  Mg     2.0     11-20                Inpatient Medications:  MEDICATIONS  (STANDING):  atorvastatin 10 milliGRAM(s) Oral at bedtime  clopidogrel Tablet 75 milliGRAM(s) Oral daily  diltiazem    milliGRAM(s) Oral daily  donepezil 5 milliGRAM(s) Oral at bedtime  enoxaparin Injectable 40 milliGRAM(s) SubCutaneous daily  melatonin 3 milliGRAM(s) Oral <User Schedule>  multivitamin 1 Tablet(s) Oral daily  risperiDONE   Tablet 0.25 milliGRAM(s) Oral two times a day  tamsulosin 0.4 milliGRAM(s) Oral at bedtime  thiamine 100 milliGRAM(s) Oral daily      Assessment: 87 year old man with dementia, reported CAD on plavix, HTN, HLD, and a-fib not on AC presents with mechanical fall and aggression.    Plan of Care:    #Post-operative evaluation-  Mr. Crowe tolerated L inguinal surgery well on 11/18.  He displays no signs or symptoms of post-op coronary ischemia or decompensated CHF.   Echocardiogram from 7/30/2019 reviewed- normal LV systolic function with no significant valvular disease.  Continue current cardiac management.     #A-fib-  Rate controlled on cardizem.  Would hold off on AC given frequent falls.     #CAD-  EKG is a-fib without ischemic changes.  Recent echo with preserved LVEF.  Continue with statin and Plavix.     #HTN-  BP acceptable on current regimen.       Over 25 minutes spent on total encounter; more than 50% of the visit was spent counseling and/or coordinating care by the attending physician.      Ronald Williamson MD [facility]  Cardiovascular Disease  [phone]

## 2019-11-20 NOTE — PROGRESS NOTE ADULT - SUBJECTIVE AND OBJECTIVE BOX
INTERVAL HPI/OVERNIGHT EVENTS: No new concerns.   Vital Signs Last 24 Hrs  T(C): 36.8 (2019 05:51), Max: 36.8 (2019 14:18)  T(F): 98.2 (2019 05:51), Max: 98.2 (2019 14:18)  HR: 94 (2019 05:51) (90 - 105)  BP: 123/76 (2019 05:51) (109/66 - 123/76)  BP(mean): --  RR: 17 (2019 05:51) (17 - 17)  SpO2: 98% (2019 05:51) (98% - 100%)  I&O's Summary    MEDICATIONS  (STANDING):  atorvastatin 10 milliGRAM(s) Oral at bedtime  clopidogrel Tablet 75 milliGRAM(s) Oral daily  diltiazem    milliGRAM(s) Oral daily  donepezil 5 milliGRAM(s) Oral at bedtime  enoxaparin Injectable 40 milliGRAM(s) SubCutaneous daily  melatonin 3 milliGRAM(s) Oral <User Schedule>  multivitamin 1 Tablet(s) Oral daily  risperiDONE   Tablet 0.25 milliGRAM(s) Oral two times a day  tamsulosin 0.4 milliGRAM(s) Oral at bedtime  thiamine 100 milliGRAM(s) Oral daily    MEDICATIONS  (PRN):    LABS:                        9.9    8.55  )-----------( 172      ( 2019 05:20 )             31.9     11-20    139  |  103  |  23  ----------------------------<  86  4.6   |  21<L>  |  1.02    Ca    9.4      2019 05:20  Phos  3.2     11-20  Mg     2.0     11-20        Urinalysis Basic - ( 2019 11:31 )    Color: YELLOW / Appearance: CLEAR / S.026 / pH: 6.0  Gluc: NEGATIVE / Ketone: NEGATIVE  / Bili: NEGATIVE / Urobili: SMALL   Blood: NEGATIVE / Protein: 20 / Nitrite: NEGATIVE   Leuk Esterase: NEGATIVE / RBC: 0-2 / WBC 0-2   Sq Epi: OCC / Non Sq Epi: x / Bacteria: NEGATIVE      CAPILLARY BLOOD GLUCOSE            Urinalysis Basic - ( 2019 11:31 )    Color: YELLOW / Appearance: CLEAR / S.026 / pH: 6.0  Gluc: NEGATIVE / Ketone: NEGATIVE  / Bili: NEGATIVE / Urobili: SMALL   Blood: NEGATIVE / Protein: 20 / Nitrite: NEGATIVE   Leuk Esterase: NEGATIVE / RBC: 0-2 / WBC 0-2   Sq Epi: OCC / Non Sq Epi: x / Bacteria: NEGATIVE        Consultant(s) Notes Reviewed:  [x ] YES  [ ] NO    PHYSICAL EXAM:  GENERAL: NAD, well-groomed, well-developed,not in any distress ,  HEAD:  Atraumatic, Normocephalic  EYES: EOMI, PERRLA, conjunctiva and sclera clear  ENMT: No tonsillar erythema, exudates, or enlargement; Moist mucous membranes, Good dentition, No lesions  NECK: Supple, No JVD, Normal thyroid  NERVOUS SYSTEM:  Alert & Oriented X2 , No focal deficit   CHEST/LUNG: Good air entry bilateral with no  rales, rhonchi, wheezing, or rubs  HEART: Regular rate and rhythm; No murmurs, rubs, or gallops  ABDOMEN: Soft, Nontender, Nondistended; Bowel sounds present  EXTREMITIES:  2+ Peripheral Pulses, No clubbing, cyanosis, or edema    Care Discussed with Consultants/Other Providers [ x] YES  [ ] NO

## 2019-11-20 NOTE — PROGRESS NOTE ADULT - ASSESSMENT
88 yo male with a dementia, CAD on plavix, HTN, HLD, afib, BPH brought in by daughter s/p fall admitted for behavioral disturbance and assistance with placement     Problem/Plan - 1:  ·  Problem: Fall.  Plan: -Pt presents s/p mechanical fall in the setting of aggression. Witnessed fall as per daughter  -With evidence of head trauma as per periorbital ecchymosis and swelling on exam. CT head negative for acute pathology but demonstrates partially visualized healed left facial fracture. There is evidence of internal fixation of a right facial fracture, incompletely seen.  -PT eval in AM.      Problem/Plan - 2:  ·  Problem: Chest pain.  Plan: - Cardiology helping.   -Pt reports reproducible chest pain likely 2/2 recent fall. Denies chest pain with exertion or activity. EKG without acute ST changes.   -CXR without evidence of acute fracture   -Low suspicion for ACS, continue with plavix for h/o of CAD. Pt would be a poor candidate for cardiac intervention in the setting of worsening dementia.      Problem/Plan - 3:  ·  Problem: Aggression.  Plan: - Resolved .   -likely 2/2 to delirium vs progression of dementia. CT head without acute pathology  -no identifiable infectious or metabolic cause. UA neg, CXR neg  -Seroquel 12.5 mg PO q6h as needed as per behavioral health recs last admission. IV haldol 0.5 mg q6 prn if unable to tolerate PO'  Psychiatry help appreciated.      Problem/Plan - 4:  ·  Problem: Dementia.  Plan: -QTc 440, treat agitation with Seroquel 12.5 mg PO q6h as needed as per behavioral health recs last admission. IV haldol .5 mg q6 prn if unable to tolerate PO. In am consider behavioral health c/s for recs regarding long term control of aggression   -SW and CM c/s  -c/w donepezil.      Problem/Plan - 5:  ·  Problem: Atrial fibrillation.  Plan: - Cardiology helping. . Continue with diltiazem for rate control  -off anticoagulation given high risk for falling.      Problem/Plan - 6:  Problem: CAD (coronary artery disease). Plan: -continue with plavix .  -continue with statin.     Problem/Plan - 7:  ·  Problem: BPH (benign prostatic hyperplasia).  Plan: -continue with tamsulosin.      Problem/Plan - 8:  ·  Problem: Hypertension, unspecified type.  Plan: -continue with diltiazem.      Problem/Plan - 9:  ·  Problem: Inguinal hernia   Plan: Surgery was consulted and S/P Hernia repair . Surgery helping.      Disposition : DC planning to [facility] as Medically stable . Surgery cleared pt .

## 2019-11-21 LAB
ANION GAP SERPL CALC-SCNC: 13 MMO/L — SIGNIFICANT CHANGE UP (ref 7–14)
BUN SERPL-MCNC: 27 MG/DL — HIGH (ref 7–23)
CALCIUM SERPL-MCNC: 9.9 MG/DL — SIGNIFICANT CHANGE UP (ref 8.4–10.5)
CHLORIDE SERPL-SCNC: 102 MMOL/L — SIGNIFICANT CHANGE UP (ref 98–107)
CO2 SERPL-SCNC: 25 MMOL/L — SIGNIFICANT CHANGE UP (ref 22–31)
CREAT SERPL-MCNC: 1.15 MG/DL — SIGNIFICANT CHANGE UP (ref 0.5–1.3)
GLUCOSE SERPL-MCNC: 112 MG/DL — HIGH (ref 70–99)
HCT VFR BLD CALC: 31.6 % — LOW (ref 39–50)
HCT VFR BLD CALC: 35.9 % — LOW (ref 39–50)
HGB BLD-MCNC: 10 G/DL — LOW (ref 13–17)
HGB BLD-MCNC: 10.8 G/DL — LOW (ref 13–17)
MAGNESIUM SERPL-MCNC: 2 MG/DL — SIGNIFICANT CHANGE UP (ref 1.6–2.6)
MCHC RBC-ENTMCNC: 29 PG — SIGNIFICANT CHANGE UP (ref 27–34)
MCHC RBC-ENTMCNC: 29.1 PG — SIGNIFICANT CHANGE UP (ref 27–34)
MCHC RBC-ENTMCNC: 30.1 % — LOW (ref 32–36)
MCHC RBC-ENTMCNC: 31.6 % — LOW (ref 32–36)
MCV RBC AUTO: 91.9 FL — SIGNIFICANT CHANGE UP (ref 80–100)
MCV RBC AUTO: 96.5 FL — SIGNIFICANT CHANGE UP (ref 80–100)
NRBC # FLD: 0 K/UL — SIGNIFICANT CHANGE UP (ref 0–0)
NRBC # FLD: 0 K/UL — SIGNIFICANT CHANGE UP (ref 0–0)
PHOSPHATE SERPL-MCNC: 2.8 MG/DL — SIGNIFICANT CHANGE UP (ref 2.5–4.5)
PLATELET # BLD AUTO: 236 K/UL — SIGNIFICANT CHANGE UP (ref 150–400)
PLATELET # BLD AUTO: 278 K/UL — SIGNIFICANT CHANGE UP (ref 150–400)
PMV BLD: 10.3 FL — SIGNIFICANT CHANGE UP (ref 7–13)
PMV BLD: 10.9 FL — SIGNIFICANT CHANGE UP (ref 7–13)
POTASSIUM SERPL-MCNC: 3.7 MMOL/L — SIGNIFICANT CHANGE UP (ref 3.5–5.3)
POTASSIUM SERPL-SCNC: 3.7 MMOL/L — SIGNIFICANT CHANGE UP (ref 3.5–5.3)
RBC # BLD: 3.44 M/UL — LOW (ref 4.2–5.8)
RBC # BLD: 3.72 M/UL — LOW (ref 4.2–5.8)
RBC # FLD: 13.1 % — SIGNIFICANT CHANGE UP (ref 10.3–14.5)
RBC # FLD: 13.2 % — SIGNIFICANT CHANGE UP (ref 10.3–14.5)
SODIUM SERPL-SCNC: 140 MMOL/L — SIGNIFICANT CHANGE UP (ref 135–145)
WBC # BLD: 16.61 K/UL — HIGH (ref 3.8–10.5)
WBC # BLD: 16.69 K/UL — HIGH (ref 3.8–10.5)
WBC # FLD AUTO: 16.61 K/UL — HIGH (ref 3.8–10.5)
WBC # FLD AUTO: 16.69 K/UL — HIGH (ref 3.8–10.5)

## 2019-11-21 PROCEDURE — 71045 X-RAY EXAM CHEST 1 VIEW: CPT | Mod: 26

## 2019-11-21 RX ADMIN — ATORVASTATIN CALCIUM 10 MILLIGRAM(S): 80 TABLET, FILM COATED ORAL at 21:24

## 2019-11-21 RX ADMIN — RISPERIDONE 0.25 MILLIGRAM(S): 4 TABLET ORAL at 05:33

## 2019-11-21 RX ADMIN — TAMSULOSIN HYDROCHLORIDE 0.4 MILLIGRAM(S): 0.4 CAPSULE ORAL at 21:24

## 2019-11-21 RX ADMIN — Medication 100 MILLIGRAM(S): at 12:44

## 2019-11-21 RX ADMIN — CLOPIDOGREL BISULFATE 75 MILLIGRAM(S): 75 TABLET, FILM COATED ORAL at 12:44

## 2019-11-21 RX ADMIN — RISPERIDONE 0.25 MILLIGRAM(S): 4 TABLET ORAL at 17:07

## 2019-11-21 RX ADMIN — DONEPEZIL HYDROCHLORIDE 5 MILLIGRAM(S): 10 TABLET, FILM COATED ORAL at 21:24

## 2019-11-21 RX ADMIN — Medication 120 MILLIGRAM(S): at 05:33

## 2019-11-21 RX ADMIN — ENOXAPARIN SODIUM 40 MILLIGRAM(S): 100 INJECTION SUBCUTANEOUS at 12:44

## 2019-11-21 RX ADMIN — Medication 3 MILLIGRAM(S): at 21:24

## 2019-11-21 RX ADMIN — Medication 1 TABLET(S): at 12:44

## 2019-11-21 NOTE — PROGRESS NOTE ADULT - ASSESSMENT
86 yo male with a dementia, CAD on plavix, HTN, HLD, afib, BPH brought in by daughter s/p fall admitted for behavioral disturbance and assistance with placement     Problem/Plan - 1:  ·  Problem: Fall.  Plan: -Pt presents s/p mechanical fall in the setting of aggression. Witnessed fall as per daughter  -With evidence of head trauma as per periorbital ecchymosis and swelling on exam. CT head negative for acute pathology but demonstrates partially visualized healed left facial fracture. There is evidence of internal fixation of a right facial fracture, incompletely seen.  -PT eval in AM.      Problem/Plan - 2:  ·  Problem: Chest pain.  Plan: - Cardiology helping.   -Pt reports reproducible chest pain likely 2/2 recent fall. Denies chest pain with exertion or activity. EKG without acute ST changes.   -CXR without evidence of acute fracture   -Low suspicion for ACS, continue with plavix for h/o of CAD. Pt would be a poor candidate for cardiac intervention in the setting of worsening dementia.      Problem/Plan - 3:  ·  Problem: Aggression.  Plan: - Resolved .   -likely 2/2 to delirium vs progression of dementia. CT head without acute pathology  -no identifiable infectious or metabolic cause. UA neg, CXR neg  -Seroquel 12.5 mg PO q6h as needed as per behavioral health recs last admission. IV haldol 0.5 mg q6 prn if unable to tolerate PO'  Psychiatry help appreciated.      Problem/Plan - 4:  ·  Problem: Dementia.  Plan: -QTc 440, treat agitation with Seroquel 12.5 mg PO q6h as needed as per behavioral health recs last admission. IV haldol .5 mg q6 prn if unable to tolerate PO. In am consider behavioral health c/s for recs regarding long term control of aggression   -SW and CM c/s  -c/w donepezil.      Problem/Plan - 5:  ·  Problem: Atrial fibrillation.  Plan: - Cardiology helping. . Continue with diltiazem for rate control  -off anticoagulation given high risk for falling.      Problem/Plan - 6:  Problem: CAD (coronary artery disease). Plan: -continue with plavix .  -continue with statin.     Problem/Plan - 7:  ·  Problem: BPH (benign prostatic hyperplasia).  Plan: -continue with tamsulosin.      Problem/Plan - 8:  ·  Problem: Hypertension, unspecified type.  Plan: -continue with diltiazem.      Problem/Plan - 9:  ·  Problem: Inguinal hernia   Plan: Surgery was consulted and S/P Hernia repair . Surgery helping.  Leucocytosis so rechecking . No fever etc.     Disposition : DC planning to [facility] as Medically stable . Surgery cleared pt .

## 2019-11-21 NOTE — PROGRESS NOTE ADULT - SUBJECTIVE AND OBJECTIVE BOX
Cardiovascular Disease Progress Note    Overnight events: No acute events overnight. Mr. Crowe denies chest pain or SOB.    Otherwise review of systems negative    Objective Findings:  T(C): 37.3 (11-21-19 @ 05:31), Max: 37.4 (11-20-19 @ 22:51)  HR: 69 (11-21-19 @ 05:31) (69 - 95)  BP: 112/60 (11-21-19 @ 05:31) (112/60 - 130/75)  RR: 18 (11-21-19 @ 05:31) (18 - 18)  SpO2: 100% (11-21-19 @ 05:31) (100% - 100%)  Wt(kg): --  Daily     Daily       Physical Exam:  Gen: NAD; Patient resting comfortably  HEENT: EOMI, Normocephalic/ atraumatic  CV: RRR, normal S1 + S2, no m/r/g  Lungs:  Normal respiratory effort; clear to auscultation bilaterally  Abd: soft, non-tender; bowel sounds present  Ext: No edema; warm and well perfused    Telemetry: n/a    Laboratory Data:                        9.9    8.55  )-----------( 172      ( 20 Nov 2019 05:20 )             31.9     11-20    139  |  103  |  23  ----------------------------<  86  4.6   |  21<L>  |  1.02    Ca    9.4      20 Nov 2019 05:20  Phos  3.2     11-20  Mg     2.0     11-20                Inpatient Medications:  MEDICATIONS  (STANDING):  atorvastatin 10 milliGRAM(s) Oral at bedtime  clopidogrel Tablet 75 milliGRAM(s) Oral daily  diltiazem    milliGRAM(s) Oral daily  donepezil 5 milliGRAM(s) Oral at bedtime  enoxaparin Injectable 40 milliGRAM(s) SubCutaneous daily  melatonin 3 milliGRAM(s) Oral <User Schedule>  multivitamin 1 Tablet(s) Oral daily  risperiDONE   Tablet 0.25 milliGRAM(s) Oral two times a day  tamsulosin 0.4 milliGRAM(s) Oral at bedtime  thiamine 100 milliGRAM(s) Oral daily      Assessment: 87 year old man with dementia, reported CAD on plavix, HTN, HLD, and a-fib not on AC presents with mechanical fall and aggression.    Plan of Care:    #Post-operative evaluation-  Mr. Crowe tolerated L inguinal surgery well on 11/18.  He displays no signs or symptoms of post-op coronary ischemia or decompensated CHF.   Echocardiogram from 7/30/2019 reviewed- normal LV systolic function with no significant valvular disease.  Continue current cardiac management.     #A-fib-  Rate controlled on cardizem.  Would hold off on AC given frequent falls.     #CAD-  EKG is a-fib without ischemic changes.  Recent echo with preserved LVEF.  Continue with statin and Plavix.     #HTN-  BP acceptable on current regimen.     Over 25 minutes spent on total encounter; more than 50% of the visit was spent counseling and/or coordinating care by the attending physician.      Ronald Williamson MD [facility]  Cardiovascular Disease  [phone]

## 2019-11-21 NOTE — PROGRESS NOTE ADULT - SUBJECTIVE AND OBJECTIVE BOX
INTERVAL HPI/OVERNIGHT EVENTS: I feel fine.   Vital Signs Last 24 Hrs  T(C): 36.9 (2019 12:48), Max: 37.4 (2019 22:51)  T(F): 98.4 (2019 12:48), Max: 99.4 (2019 22:51)  HR: 76 (2019 12:48) (69 - 86)  BP: 130/66 (2019 12:48) (112/60 - 130/66)  BP(mean): --  RR: 18 (2019 12:48) (18 - 18)  SpO2: 100% (2019 12:48) (100% - 100%)  I&O's Summary    MEDICATIONS  (STANDING):  atorvastatin 10 milliGRAM(s) Oral at bedtime  clopidogrel Tablet 75 milliGRAM(s) Oral daily  diltiazem    milliGRAM(s) Oral daily  donepezil 5 milliGRAM(s) Oral at bedtime  enoxaparin Injectable 40 milliGRAM(s) SubCutaneous daily  melatonin 3 milliGRAM(s) Oral <User Schedule>  multivitamin 1 Tablet(s) Oral daily  risperiDONE   Tablet 0.25 milliGRAM(s) Oral two times a day  tamsulosin 0.4 milliGRAM(s) Oral at bedtime  thiamine 100 milliGRAM(s) Oral daily    MEDICATIONS  (PRN):    LABS:                        10.0   16.61 )-----------( 236      ( 2019 14:05 )             31.6     11-21    140  |  102  |  27<H>  ----------------------------<  112<H>  3.7   |  25  |  1.15    Ca    9.9      2019 06:53  Phos  2.8     11-  Mg     2.0     11-21        Urinalysis Basic - ( 2019 11:31 )    Color: YELLOW / Appearance: CLEAR / S.026 / pH: 6.0  Gluc: NEGATIVE / Ketone: NEGATIVE  / Bili: NEGATIVE / Urobili: SMALL   Blood: NEGATIVE / Protein: 20 / Nitrite: NEGATIVE   Leuk Esterase: NEGATIVE / RBC: 0-2 / WBC 0-2   Sq Epi: OCC / Non Sq Epi: x / Bacteria: NEGATIVE      CAPILLARY BLOOD GLUCOSE            Urinalysis Basic - ( 2019 11:31 )    Color: YELLOW / Appearance: CLEAR / S.026 / pH: 6.0  Gluc: NEGATIVE / Ketone: NEGATIVE  / Bili: NEGATIVE / Urobili: SMALL   Blood: NEGATIVE / Protein: 20 / Nitrite: NEGATIVE   Leuk Esterase: NEGATIVE / RBC: 0-2 / WBC 0-2   Sq Epi: OCC / Non Sq Epi: x / Bacteria: NEGATIVE        Consultant(s) Notes Reviewed:  [x ] YES  [ ] NO    PHYSICAL EXAM:  GENERAL: NAD, well-groomed, well-developed,not in any distress ,  HEAD:  Atraumatic, Normocephalic  EYES: EOMI, PERRLA, conjunctiva and sclera clear  ENMT: No tonsillar erythema, exudates, or enlargement; Moist mucous membranes, Good dentition, No lesions  NECK: Supple, No JVD, Normal thyroid  NERVOUS SYSTEM:  Alert & Oriented X1, No focal deficit   CHEST/LUNG: Good air entry bilateral with no  rales, rhonchi, wheezing, or rubs  HEART: Regular rate and rhythm; No murmurs, rubs, or gallops  ABDOMEN: Soft, Nontender, Nondistended; Bowel sounds present, Incision site dressed   EXTREMITIES:  2+ Peripheral Pulses, No clubbing, cyanosis, or edema    Care Discussed with Consultants/Other Providers [ x] YES  [ ] NO

## 2019-11-22 VITALS
DIASTOLIC BLOOD PRESSURE: 50 MMHG | TEMPERATURE: 98 F | RESPIRATION RATE: 18 BRPM | HEART RATE: 81 BPM | OXYGEN SATURATION: 99 % | SYSTOLIC BLOOD PRESSURE: 110 MMHG

## 2019-11-22 LAB
ANION GAP SERPL CALC-SCNC: 11 MMO/L — SIGNIFICANT CHANGE UP (ref 7–14)
APPEARANCE UR: CLEAR — SIGNIFICANT CHANGE UP
BACTERIA # UR AUTO: NEGATIVE — SIGNIFICANT CHANGE UP
BASOPHILS # BLD AUTO: 0.03 K/UL — SIGNIFICANT CHANGE UP (ref 0–0.2)
BASOPHILS NFR BLD AUTO: 0.2 % — SIGNIFICANT CHANGE UP (ref 0–2)
BILIRUB UR-MCNC: NEGATIVE — SIGNIFICANT CHANGE UP
BLOOD UR QL VISUAL: NEGATIVE — SIGNIFICANT CHANGE UP
BUN SERPL-MCNC: 34 MG/DL — HIGH (ref 7–23)
CALCIUM SERPL-MCNC: 9.2 MG/DL — SIGNIFICANT CHANGE UP (ref 8.4–10.5)
CHLORIDE SERPL-SCNC: 105 MMOL/L — SIGNIFICANT CHANGE UP (ref 98–107)
CO2 SERPL-SCNC: 25 MMOL/L — SIGNIFICANT CHANGE UP (ref 22–31)
COLOR SPEC: YELLOW — SIGNIFICANT CHANGE UP
CREAT SERPL-MCNC: 1.13 MG/DL — SIGNIFICANT CHANGE UP (ref 0.5–1.3)
EOSINOPHIL # BLD AUTO: 0.08 K/UL — SIGNIFICANT CHANGE UP (ref 0–0.5)
EOSINOPHIL NFR BLD AUTO: 0.5 % — SIGNIFICANT CHANGE UP (ref 0–6)
GLUCOSE SERPL-MCNC: 89 MG/DL — SIGNIFICANT CHANGE UP (ref 70–99)
GLUCOSE UR-MCNC: NEGATIVE — SIGNIFICANT CHANGE UP
HCT VFR BLD CALC: 29.1 % — LOW (ref 39–50)
HGB BLD-MCNC: 9.1 G/DL — LOW (ref 13–17)
HYALINE CASTS # UR AUTO: NEGATIVE — SIGNIFICANT CHANGE UP
IMM GRANULOCYTES NFR BLD AUTO: 0.5 % — SIGNIFICANT CHANGE UP (ref 0–1.5)
KETONES UR-MCNC: NEGATIVE — SIGNIFICANT CHANGE UP
LEUKOCYTE ESTERASE UR-ACNC: NEGATIVE — SIGNIFICANT CHANGE UP
LYMPHOCYTES # BLD AUTO: 0.97 K/UL — LOW (ref 1–3.3)
LYMPHOCYTES # BLD AUTO: 6.1 % — LOW (ref 13–44)
MAGNESIUM SERPL-MCNC: 2.1 MG/DL — SIGNIFICANT CHANGE UP (ref 1.6–2.6)
MCHC RBC-ENTMCNC: 29 PG — SIGNIFICANT CHANGE UP (ref 27–34)
MCHC RBC-ENTMCNC: 31.3 % — LOW (ref 32–36)
MCV RBC AUTO: 92.7 FL — SIGNIFICANT CHANGE UP (ref 80–100)
MONOCYTES # BLD AUTO: 1.47 K/UL — HIGH (ref 0–0.9)
MONOCYTES NFR BLD AUTO: 9.2 % — SIGNIFICANT CHANGE UP (ref 2–14)
NEUTROPHILS # BLD AUTO: 13.34 K/UL — HIGH (ref 1.8–7.4)
NEUTROPHILS NFR BLD AUTO: 83.5 % — HIGH (ref 43–77)
NITRITE UR-MCNC: NEGATIVE — SIGNIFICANT CHANGE UP
NRBC # FLD: 0 K/UL — SIGNIFICANT CHANGE UP (ref 0–0)
PH UR: 6 — SIGNIFICANT CHANGE UP (ref 5–8)
PHOSPHATE SERPL-MCNC: 2.8 MG/DL — SIGNIFICANT CHANGE UP (ref 2.5–4.5)
PLATELET # BLD AUTO: 250 K/UL — SIGNIFICANT CHANGE UP (ref 150–400)
PMV BLD: 10.7 FL — SIGNIFICANT CHANGE UP (ref 7–13)
POTASSIUM SERPL-MCNC: 4.2 MMOL/L — SIGNIFICANT CHANGE UP (ref 3.5–5.3)
POTASSIUM SERPL-SCNC: 4.2 MMOL/L — SIGNIFICANT CHANGE UP (ref 3.5–5.3)
PROT UR-MCNC: 20 — SIGNIFICANT CHANGE UP
RBC # BLD: 3.14 M/UL — LOW (ref 4.2–5.8)
RBC # FLD: 13.1 % — SIGNIFICANT CHANGE UP (ref 10.3–14.5)
RBC CASTS # UR COMP ASSIST: SIGNIFICANT CHANGE UP (ref 0–?)
SODIUM SERPL-SCNC: 141 MMOL/L — SIGNIFICANT CHANGE UP (ref 135–145)
SP GR SPEC: 1.02 — SIGNIFICANT CHANGE UP (ref 1–1.04)
SPECIMEN SOURCE: SIGNIFICANT CHANGE UP
SPECIMEN SOURCE: SIGNIFICANT CHANGE UP
SQUAMOUS # UR AUTO: SIGNIFICANT CHANGE UP
UROBILINOGEN FLD QL: SIGNIFICANT CHANGE UP
WBC # BLD: 15.97 K/UL — HIGH (ref 3.8–10.5)
WBC # FLD AUTO: 15.97 K/UL — HIGH (ref 3.8–10.5)
WBC UR QL: SIGNIFICANT CHANGE UP (ref 0–?)

## 2019-11-22 PROCEDURE — 99222 1ST HOSP IP/OBS MODERATE 55: CPT

## 2019-11-22 RX ORDER — TAMSULOSIN HYDROCHLORIDE 0.4 MG/1
1 CAPSULE ORAL
Qty: 0 | Refills: 0 | DISCHARGE
Start: 2019-11-22

## 2019-11-22 RX ORDER — DONEPEZIL HYDROCHLORIDE 10 MG/1
1 TABLET, FILM COATED ORAL
Qty: 0 | Refills: 0 | DISCHARGE
Start: 2019-11-22

## 2019-11-22 RX ORDER — MEMANTINE HYDROCHLORIDE 10 MG/1
1 TABLET ORAL
Qty: 0 | Refills: 0 | DISCHARGE

## 2019-11-22 RX ORDER — CLOPIDOGREL BISULFATE 75 MG/1
1 TABLET, FILM COATED ORAL
Qty: 0 | Refills: 0 | DISCHARGE
Start: 2019-11-22

## 2019-11-22 RX ORDER — DONEPEZIL HYDROCHLORIDE 10 MG/1
1 TABLET, FILM COATED ORAL
Qty: 0 | Refills: 0 | DISCHARGE

## 2019-11-22 RX ORDER — DILTIAZEM HCL 120 MG
1 CAPSULE, EXT RELEASE 24 HR ORAL
Qty: 0 | Refills: 0 | DISCHARGE
Start: 2019-11-22

## 2019-11-22 RX ORDER — ATORVASTATIN CALCIUM 80 MG/1
1 TABLET, FILM COATED ORAL
Qty: 0 | Refills: 0 | DISCHARGE
Start: 2019-11-22

## 2019-11-22 RX ORDER — CLOPIDOGREL BISULFATE 75 MG/1
1 TABLET, FILM COATED ORAL
Qty: 0 | Refills: 0 | DISCHARGE

## 2019-11-22 RX ORDER — TAMSULOSIN HYDROCHLORIDE 0.4 MG/1
1 CAPSULE ORAL
Qty: 0 | Refills: 0 | DISCHARGE

## 2019-11-22 RX ORDER — THIAMINE MONONITRATE (VIT B1) 100 MG
1 TABLET ORAL
Qty: 0 | Refills: 0 | DISCHARGE
Start: 2019-11-22

## 2019-11-22 RX ORDER — ATORVASTATIN CALCIUM 80 MG/1
1 TABLET, FILM COATED ORAL
Qty: 0 | Refills: 0 | DISCHARGE

## 2019-11-22 RX ORDER — LANOLIN ALCOHOL/MO/W.PET/CERES
1 CREAM (GRAM) TOPICAL
Qty: 0 | Refills: 0 | DISCHARGE
Start: 2019-11-22

## 2019-11-22 RX ORDER — RISPERIDONE 4 MG/1
1 TABLET ORAL
Qty: 0 | Refills: 0 | DISCHARGE
Start: 2019-11-22

## 2019-11-22 RX ORDER — THIAMINE MONONITRATE (VIT B1) 100 MG
1 TABLET ORAL
Qty: 0 | Refills: 0 | DISCHARGE

## 2019-11-22 RX ORDER — DILTIAZEM HCL 120 MG
1 CAPSULE, EXT RELEASE 24 HR ORAL
Qty: 0 | Refills: 0 | DISCHARGE

## 2019-11-22 RX ADMIN — CLOPIDOGREL BISULFATE 75 MILLIGRAM(S): 75 TABLET, FILM COATED ORAL at 13:02

## 2019-11-22 RX ADMIN — RISPERIDONE 0.25 MILLIGRAM(S): 4 TABLET ORAL at 05:41

## 2019-11-22 RX ADMIN — Medication 100 MILLIGRAM(S): at 13:02

## 2019-11-22 RX ADMIN — Medication 1 TABLET(S): at 13:02

## 2019-11-22 RX ADMIN — Medication 120 MILLIGRAM(S): at 05:41

## 2019-11-22 RX ADMIN — RISPERIDONE 0.25 MILLIGRAM(S): 4 TABLET ORAL at 17:03

## 2019-11-22 RX ADMIN — ENOXAPARIN SODIUM 40 MILLIGRAM(S): 100 INJECTION SUBCUTANEOUS at 13:03

## 2019-11-22 NOTE — DIETITIAN INITIAL EVALUATION ADULT. - SIGNS/SYMPTOMS
As evidenced severe bi-temporal muscle wasting, moderate fat wasting of buccal region, weight loss. As evidenced by severe bi-temporal muscle wasting, severely depressed orbital fat pads, weight loss.

## 2019-11-22 NOTE — PROGRESS NOTE ADULT - ASSESSMENT
88 yo male with a dementia, CAD on plavix, HTN, HLD, afib, BPH brought in by daughter s/p fall admitted for behavioral disturbance and assistance with placement     Problem/Plan - 1:  ·  Problem: Fall.  Plan: -Pt presents s/p mechanical fall in the setting of aggression. Witnessed fall as per daughter  -With evidence of head trauma as per periorbital ecchymosis and swelling on exam. CT head negative for acute pathology but demonstrates partially visualized healed left facial fracture. There is evidence of internal fixation of a right facial fracture, incompletely seen.  -PT eval in AM.      Problem/Plan - 2:  ·  Problem: Chest pain.  Plan: - Cardiology helping.   -Pt reports reproducible chest pain likely 2/2 recent fall. Denies chest pain with exertion or activity. EKG without acute ST changes.   -CXR without evidence of acute fracture   -Low suspicion for ACS, continue with plavix for h/o of CAD. Pt would be a poor candidate for cardiac intervention in the setting of worsening dementia.      Problem/Plan - 3:  ·  Problem: Aggression.  Plan: - Resolved .   -likely 2/2 to delirium vs progression of dementia. CT head without acute pathology  -no identifiable infectious or metabolic cause. UA neg, CXR neg  -Seroquel 12.5 mg PO q6h as needed as per behavioral health recs last admission. IV haldol 0.5 mg q6 prn if unable to tolerate PO'  Psychiatry help appreciated.      Problem/Plan - 4:  ·  Problem: Dementia.  Plan: -QTc 440, treat agitation with Seroquel 12.5 mg PO q6h as needed as per behavioral health recs last admission. IV haldol .5 mg q6 prn if unable to tolerate PO. In am consider behavioral health c/s for recs regarding long term control of aggression   -SW and CM c/s  -c/w donepezil.      Problem/Plan - 5:  ·  Problem: Atrial fibrillation.  Plan: - Cardiology helping. . Continue with diltiazem for rate control  -off anticoagulation given high risk for falling.      Problem/Plan - 6:  Problem: CAD (coronary artery disease). Plan: -continue with plavix .  -continue with statin.     Problem/Plan - 7:  ·  Problem: BPH (benign prostatic hyperplasia).  Plan: -continue with tamsulosin.      Problem/Plan - 8:  ·  Problem: Hypertension, unspecified type.  Plan: -continue with diltiazem.      Problem/Plan - 9:  ·  Problem: Inguinal hernia   Plan: Surgery was consulted and S/P Hernia repair . Surgery helping.  Leucocytosis so rechecking . No fever etc.  ID consult noted . Rpt CBC within a week or tomorrow if staying . If spikes temp then will do Blood culture etc.      Disposition : DC planning to [facility] as Medically stable . Surgery cleared pt .

## 2019-11-22 NOTE — DIETITIAN INITIAL EVALUATION ADULT. - PERTINENT MEDS FT
MEDICATIONS  (STANDING):  atorvastatin 10 milliGRAM(s) Oral at bedtime  clopidogrel Tablet 75 milliGRAM(s) Oral daily  diltiazem    milliGRAM(s) Oral daily  donepezil 5 milliGRAM(s) Oral at bedtime  enoxaparin Injectable 40 milliGRAM(s) SubCutaneous daily  melatonin 3 milliGRAM(s) Oral <User Schedule>  multivitamin 1 Tablet(s) Oral daily  risperiDONE   Tablet 0.25 milliGRAM(s) Oral two times a day  tamsulosin 0.4 milliGRAM(s) Oral at bedtime  thiamine 100 milliGRAM(s) Oral daily

## 2019-11-22 NOTE — DIETITIAN INITIAL EVALUATION ADULT. - PROBLEM SELECTOR PLAN 1
-Pt presents s/p mechanical fall in the setting of aggression. Witnessed fall as per daughter  -With evidence of head trauma as per periorbital ecchymosis and swelling on exam. CT head negative for acute pathology but demonstrates partially visualized healed left facial fracture. There is evidence of internal fixation of a right facial fracture, incompletely seen.  -PT eval in AM

## 2019-11-22 NOTE — DIETITIAN INITIAL EVALUATION ADULT. - PROBLEM SELECTOR PLAN 4
-QTc 440, treat agitation with Seroquel 12.5 mg PO q6h as needed as per behavioral health recs last admission. IV haldol .5 mg q6 prn if unable to tolerate PO. In am consider behavioral health c/s for recs regarding long term control of aggression   -SW and CM c/s  -c/w donepezil

## 2019-11-22 NOTE — DIETITIAN INITIAL EVALUATION ADULT. - PHYSICAL APPEARANCE
.                    Subcutaneous FAT LOSS:  [ ] Orbital fat pads region, [MODERATE]  Buccal fat region, [ ]Triceps region,  [ ]Ribs region       MUSCLE WASTING: [SEVERE]  Temples region, [MODERATE] Clavicle region, [ ]Shoulder region, [ ]Scapula region, [ ] Interosseous region,  [ ]thigh region, [ ]Calf region./underweight .                    Subcutaneous FAT LOSS:  [SEVERE] Orbital fat pads region, [MODERATE]  Buccal fat region, [ ]Triceps region,  [ ]Ribs region       MUSCLE WASTING: [SEVERE]  Temples region, [MODERATE] Clavicle region, [ ]Shoulder region, [ ]Scapula region, [ ] Interosseous region,  [ ]thigh region, [ ]Calf region./underweight

## 2019-11-22 NOTE — CONSULT NOTE ADULT - SUBJECTIVE AND OBJECTIVE BOX
HPI:  86 yo male with a   ·	dementia  ·	CAD on plavix  ·	HTN  ·	HLD  ·	afib  ·	BPH       Pt is a poor historian due to dementia.     Per chart, He was admitted on  for a change in behavior  The patient had become more aggressive.     On , pt had surgical repair of a reducible left inguinal hernia with mesh.       PAST MEDICAL & SURGICAL HISTORY:  CAD (coronary artery disease)  History of BPH  Dementia  Atrial fibrillation  Other hyperlipidemia  Hypertension, unspecified type  No significant past surgical history      Allergies    No Known Allergies    Intolerances        ANTIMICROBIALS:      OTHER MEDS:  atorvastatin 10 milliGRAM(s) Oral at bedtime  clopidogrel Tablet 75 milliGRAM(s) Oral daily  diltiazem    milliGRAM(s) Oral daily  donepezil 5 milliGRAM(s) Oral at bedtime  enoxaparin Injectable 40 milliGRAM(s) SubCutaneous daily  melatonin 3 milliGRAM(s) Oral <User Schedule>  multivitamin 1 Tablet(s) Oral daily  multivitamin 1 Tablet(s) Oral daily  risperiDONE   Tablet 0.25 milliGRAM(s) Oral two times a day  tamsulosin 0.4 milliGRAM(s) Oral at bedtime  thiamine 100 milliGRAM(s) Oral daily      SOCIAL HISTORY: Pt unable to provide due to dementia  Lives at home  No recent travel    FAMILY HISTORY:Pt unable to provide due to dementia  No pertinent family history in first degree relatives      REVIEW OF SYSTEMS  [ x ] ROS unobtainable because:  Pt unable to provide due to dementia  [  ] All other systems negative except as noted below:	    Constitutional:  [ ] fever [ ] chills  [ ] weight loss  [ ] weakness  Skin:  [ ] rash [ ] phlebitis	  Eyes: [ ] icterus [ ] pain  [ ] discharge	  ENMT: [ ] sore throat  [ ] thrush [ ] ulcers [ ] exudates  Respiratory: [ ] dyspnea [ ] hemoptysis [ ] cough [ ] sputum	  Cardiovascular:  [ ] chest pain [ ] palpitations [ ] edema	  Gastrointestinal:  [ ] nausea [ ] vomiting [ ] diarrhea [ ] constipation [ ] pain	  Genitourinary:  [ ] dysuria [ ] frequency [ ] hematuria [ ] discharge [ ] flank pain  [ ] incontinence  Musculoskeletal:  [ ] myalgias [ ] arthralgias [ ] arthritis  [ ] back pain  Neurological:  [ ] headache [ ] seizures  [ ] confusion/altered mental status  Psychiatric:  [ ] anxiety [ ] depression	  Hematology/Lymphatics:  [ ] lymphadenopathy  Endocrine:  [ ] adrenal [ ] thyroid  Allergic/Immunologic:	 [ ] transplant [ ] seasonal    PHYSICAL EXAM:  General: [ ] non-toxic  HEAD/EYES: [ ] PERRL [ ] white sclera [ ] icterus  ENT:  [ ] normal [ ] supple [ ] thrush [ ] pharyngeal exudate  Cardiovascular:   [ ] murmur [ ] normal [ ] PPM/AICD  Respiratory:  [ ] clear to ausculation bilaterally  GI:  [ ] soft, non-tender, normal bowel sounds  :  [ ] chavez [ ] no CVA tenderness   Musculoskeletal:  [ ] no synovitis  Neurologic:  [ ] non-focal exam   Skin:  [ ] no rash  Lymph: [ ] no lymphadenopathy  Psychiatric:  [ ] appropriate affect [ ] alert & oriented  Lines:  [ ] no phlebitis [ ] central line          Drug Dosing Weight  Height (cm): 193 (2019 08:25)  Weight (kg): 61.7 (2019 08:25)  BMI (kg/m2): 16.6 (2019 08:25)  BSA (m2): 1.88 (2019 08:25)    Vital Signs Last 24 Hrs  T(F): 98.5 (19 @ 05:40), Max: 99.4 (19 @ 22:51)    Vital Signs Last 24 Hrs  HR: 93 (19 @ 05:40) (76 - 93)  BP: 129/76 (19 @ 05:40) (129/76 - 154/83)  RR: 18 (19 @ 05:40)  SpO2: 96% (19 @ 05:40) (95% - 100%)  Wt(kg): --                          9.1    15.97 )-----------( 250      ( 2019 05:30 )             29.1           141  |  105  |  34<H>  ----------------------------<  89  4.2   |  25  |  1.13    Ca    9.2      2019 05:30  Phos  2.8       Mg     2.1             Urinalysis Basic - ( 2019 11:01 )    Color: YELLOW / Appearance: CLEAR / S.024 / pH: 6.0  Gluc: NEGATIVE / Ketone: NEGATIVE  / Bili: NEGATIVE / Urobili: TRACE   Blood: NEGATIVE / Protein: 20 / Nitrite: NEGATIVE   Leuk Esterase: NEGATIVE / RBC: 3-5 / WBC 0-2   Sq Epi: OCC / Non Sq Epi: x / Bacteria: NEGATIVE        MICROBIOLOGY:  Culture - Urine (19 @ 17:45)    Culture - Urine:   GPC^Gram pos. cocci  COLONY COUNT: LESS THAN 10,000 CFU/ML    Specimen Source: URINE MIDSTREAM      RADIOLOGY:    < from: Xray Chest 1 View- PORTABLE-Urgent (19 @ 18:03) >  EXAM:  XR CHEST PORTABLE URGENT 1V        PROCEDURE DATE:  2019         INTERPRETATION:    Portable chest xray: WBC    Comparison: Most recent prior     FINDINGS:    Lines/Tubes: None    Heart and mediastinum:  Unremarkable.    Lungs, pleura, and airways: No consolidations or edema. Low lung volumes    Bones and soft tissues: The bones and soft tissues are unchanged.    Impression:    Low lung volumes. No consolidations or edema.    < end of copied text > HPI:  86 yo male with a   ·	dementia  ·	CAD on plavix  ·	HTN  ·	HLD  ·	afib  ·	BPH       Pt is a poor historian due to dementia.     Per chart, He was admitted on  for a change in behavior  The patient had become more aggressive.     On , pt had surgical repair of a reducible left inguinal hernia with mesh.     At this time, he is alert and verbal . He is aware that he had surrgery. He appears calm. he has some word finding difficultly but is apporpiate.      PAST MEDICAL & SURGICAL HISTORY:  CAD (coronary artery disease)  History of BPH  Dementia  Atrial fibrillation  Other hyperlipidemia  Hypertension, unspecified type  No significant past surgical history      Allergies    No Known Allergies    Intolerances        ANTIMICROBIALS:      OTHER MEDS:  atorvastatin 10 milliGRAM(s) Oral at bedtime  clopidogrel Tablet 75 milliGRAM(s) Oral daily  diltiazem    milliGRAM(s) Oral daily  donepezil 5 milliGRAM(s) Oral at bedtime  enoxaparin Injectable 40 milliGRAM(s) SubCutaneous daily  melatonin 3 milliGRAM(s) Oral <User Schedule>  multivitamin 1 Tablet(s) Oral daily  multivitamin 1 Tablet(s) Oral daily  risperiDONE   Tablet 0.25 milliGRAM(s) Oral two times a day  tamsulosin 0.4 milliGRAM(s) Oral at bedtime  thiamine 100 milliGRAM(s) Oral daily      SOCIAL HISTORY:   Lives at home  No recent travel  Former smoker    FAMILY HISTORY: Pt unable to provide due to dementia/ limitted ability to provide history    No pertinent family history in first degree relatives per chart      REVIEW OF SYSTEMS  [ x ] limitted due to dementia- but denies focal complains  [  ] All other systems negative except as noted below:	    Constitutional:  [ ] fever [ ] chills  [ ] weight loss  [ ] weakness  Skin:  [ ] rash [ ] phlebitis	  Eyes: [ ] icterus [ ] pain  [ ] discharge	  ENMT: [ ] sore throat  [ ] thrush [ ] ulcers [ ] exudates  Respiratory: [ ] dyspnea [ ] hemoptysis [ ] cough [ ] sputum	  Cardiovascular:  [ ] chest pain [ ] palpitations [ ] edema	  Gastrointestinal:  [ ] nausea [ ] vomiting [ ] diarrhea [ ] constipation [ ] pain	  Genitourinary:  [ ] dysuria [ ] frequency [ ] hematuria [ ] discharge [ ] flank pain  [ ] incontinence  Musculoskeletal:  [ ] myalgias [ ] arthralgias [ ] arthritis  [ ] back pain  Neurological:  [ ] headache [ ] seizures  [ ] confusion/altered mental status  Psychiatric:  [ ] anxiety [ ] depression	  Hematology/Lymphatics:  [ ] lymphadenopathy  Endocrine:  [ ] adrenal [ ] thyroid  Allergic/Immunologic:	 [ ] transplant [ ] seasonal    PHYSICAL EXAM:  General: [ x] thin  HEAD/EYES: [ ] PERRL [ x] white sclera [ ] icterus  ENT:  [ ] normal [ x] supple [ ] thrush [ ] pharyngeal exudate  Cardiovascular:   [ ] murmur [x ] normal [ ] PPM/AICD  Respiratory:  [x ] clear to ausculation bilaterally  GI:  [x ] soft, non-tender, normal bowel sounds  :  [ ] chavez [ x] no CVA tenderness   Musculoskeletal:  [x ] no synovitis  Neurologic:  [ ] non-focal exam   Skin:  [ x] left groin - incision is heeling  no redness, no induration- does not appear infected  Lymph: [ ] no lymphadenopathy  Psychiatric:  [ x] appropriate affect [ ] alert & oriented  Lines:  [x ] no phlebitis [ ] central line          Drug Dosing Weight  Height (cm): 193 (2019 08:25)  Weight (kg): 61.7 (2019 08:25)  BMI (kg/m2): 16.6 (2019 08:25)  BSA (m2): 1.88 (2019 08:25)    Vital Signs Last 24 Hrs  T(F): 98.5 (19 @ 05:40), Max: 99.4 (19 @ 22:51)    Vital Signs Last 24 Hrs  HR: 93 (19 @ 05:40) (76 - 93)  BP: 129/76 (19 @ 05:40) (129/76 - 154/83)  RR: 18 (19 @ 05:40)  SpO2: 96% (19 @ 05:40) (95% - 100%)  Wt(kg): --                          9.1    15.97 )-----------( 250      ( 2019 05:30 )             29.1           141  |  105  |  34<H>  ----------------------------<  89  4.2   |  25  |  1.13    Ca    9.2      2019 05:30  Phos  2.8     -  Mg     2.1             Urinalysis Basic - ( 2019 11:01 )    Color: YELLOW / Appearance: CLEAR / S.024 / pH: 6.0  Gluc: NEGATIVE / Ketone: NEGATIVE  / Bili: NEGATIVE / Urobili: TRACE   Blood: NEGATIVE / Protein: 20 / Nitrite: NEGATIVE   Leuk Esterase: NEGATIVE / RBC: 3-5 / WBC 0-2   Sq Epi: OCC / Non Sq Epi: x / Bacteria: NEGATIVE        MICROBIOLOGY:  Culture - Urine (19 @ 17:45)    Culture - Urine:   GPC^Gram pos. cocci  COLONY COUNT: LESS THAN 10,000 CFU/ML    Specimen Source: URINE MIDSTREAM      RADIOLOGY:    < from: Xray Chest 1 View- PORTABLE-Urgent (19 @ 18:03) >  EXAM:  XR CHEST PORTABLE URGENT 1V        PROCEDURE DATE:  2019         INTERPRETATION:    Portable chest xray: WBC    Comparison: Most recent prior     FINDINGS:    Lines/Tubes: None    Heart and mediastinum:  Unremarkable.    Lungs, pleura, and airways: No consolidations or edema. Low lung volumes    Bones and soft tissues: The bones and soft tissues are unchanged.    Impression:    Low lung volumes. No consolidations or edema.    < end of copied text >

## 2019-11-22 NOTE — DIETITIAN INITIAL EVALUATION ADULT. - PROBLEM SELECTOR PLAN 2
-Pt reports reproducible chest pain likely 2/2 recent fall. Denies chest pain with exertion or activity. EKG without acute ST changes.   -CXR without evidence of acute fracture   -Low suspicion for ACS, continue with plavix for h/o of CAD. Pt would be a poor candidate for cardiac intervention in the setting of worsening dementia

## 2019-11-22 NOTE — CHART NOTE - NSCHARTNOTEFT_GEN_A_CORE
NUTRITION SERVICES     Upon Nutritional Assessment by the Registered Dietitian your patient was determined to meet criteria/ has evidence of the following diagnosis/diagnoses:  [ ] Mild Protein Calorie Malnutrition   [ ] Moderate Protein Calorie Malnutrition   [X] Severe Protein Calorie Malnutrition   [ ] Unspecified Protein Calorie Malnutrition   [ ] Underweight / BMI <19  [ ] Morbid Obesity / BMI >40    Findings as based on:  •  Comprehensive nutritional assessment and consultation    Please refer to Initial Dietitian Evaluation via documents section of [employer] for further recommendations.    Luli Werner RD,CD/N,CDE

## 2019-11-22 NOTE — DISCHARGE NOTE NURSING/CASE MANAGEMENT/SOCIAL WORK - PATIENT PORTAL LINK FT
You can access the FollowMyHealth Patient Portal offered by [facility] by registering at the following website: [email or web address]. By joining [employer]’s FollowMyHealth portal, you will also be able to view your health information using other applications (apps) compatible with our system.

## 2019-11-22 NOTE — DIETITIAN INITIAL EVALUATION ADULT. - ADD RECOMMEND
1. Recommend liberalize diet to Low Na. 2. Continue with daily MVI Supplement 3. Suggest adding Ensure Enlive once daily.  4. SEVERE-- MALNUTRITION ALERT. 5. Nutrition Services remains available.

## 2019-11-22 NOTE — PROGRESS NOTE ADULT - REASON FOR ADMISSION
s/p fall, aggression

## 2019-11-22 NOTE — PROGRESS NOTE ADULT - SUBJECTIVE AND OBJECTIVE BOX
INTERVAL HPI/OVERNIGHT EVENTS: I feel fine . I am walking and eating okay.   Vital Signs Last 24 Hrs  T(C): 36.8 (2019 13:44), Max: 37.1 (2019 21:16)  T(F): 98.2 (2019 13:44), Max: 98.8 (2019 21:16)  HR: 81 (2019 13:44) (81 - 93)  BP: 110/50 (2019 13:44) (110/50 - 154/83)  BP(mean): --  RR: 18 (2019 13:44) (18 - 18)  SpO2: 99% (2019 13:44) (95% - 99%)  I&O's Summary    MEDICATIONS  (STANDING):  atorvastatin 10 milliGRAM(s) Oral at bedtime  clopidogrel Tablet 75 milliGRAM(s) Oral daily  diltiazem    milliGRAM(s) Oral daily  donepezil 5 milliGRAM(s) Oral at bedtime  enoxaparin Injectable 40 milliGRAM(s) SubCutaneous daily  melatonin 3 milliGRAM(s) Oral <User Schedule>  multivitamin 1 Tablet(s) Oral daily  multivitamin 1 Tablet(s) Oral daily  risperiDONE   Tablet 0.25 milliGRAM(s) Oral two times a day  tamsulosin 0.4 milliGRAM(s) Oral at bedtime  thiamine 100 milliGRAM(s) Oral daily    MEDICATIONS  (PRN):    LABS:                        9.1    15.97 )-----------( 250      ( 2019 05:30 )             29.1     11-22    141  |  105  |  34<H>  ----------------------------<  89  4.2   |  25  |  1.13    Ca    9.2      2019 05:30  Phos  2.8       Mg     2.1             Urinalysis Basic - ( 2019 11:01 )    Color: YELLOW / Appearance: CLEAR / S.024 / pH: 6.0  Gluc: NEGATIVE / Ketone: NEGATIVE  / Bili: NEGATIVE / Urobili: TRACE   Blood: NEGATIVE / Protein: 20 / Nitrite: NEGATIVE   Leuk Esterase: NEGATIVE / RBC: 3-5 / WBC 0-2   Sq Epi: OCC / Non Sq Epi: x / Bacteria: NEGATIVE      CAPILLARY BLOOD GLUCOSE            Urinalysis Basic - ( 2019 11:01 )    Color: YELLOW / Appearance: CLEAR / S.024 / pH: 6.0  Gluc: NEGATIVE / Ketone: NEGATIVE  / Bili: NEGATIVE / Urobili: TRACE   Blood: NEGATIVE / Protein: 20 / Nitrite: NEGATIVE   Leuk Esterase: NEGATIVE / RBC: 3-5 / WBC 0-2   Sq Epi: OCC / Non Sq Epi: x / Bacteria: NEGATIVE      REVIEW OF SYSTEMS:  CONSTITUTIONAL: No fever, weight loss, or fatigue  EYES: No eye pain, visual disturbances, or discharge  ENMT:  No difficulty hearing, tinnitus, vertigo; No sinus or throat pain  NECK: No pain or stiffness  RESPIRATORY: No cough, wheezing, chills or hemoptysis; No shortness of breath  CARDIOVASCULAR: No chest pain, palpitations, dizziness, or leg swelling  GASTROINTESTINAL: No abdominal or epigastric pain. No nausea, vomiting, or hematemesis; No diarrhea or constipation. No melena or hematochezia.  GENITOURINARY: No dysuria, frequency, hematuria, or incontinence  NEUROLOGICAL: No headaches, memory loss, loss of strength, numbness, or tremors      Consultant(s) Notes Reviewed:  [x ] YES  [ ] NO    PHYSICAL EXAM:  GENERAL: NAD, well-groomed, well-developed,not in any distress ,  HEAD:  Atraumatic, Normocephalic  EYES: EOMI, PERRLA, conjunctiva and sclera clear  NECK: Supple, No JVD, Normal thyroid  NERVOUS SYSTEM:  Alert & Oriented X2 to 3, No focal deficit   CHEST/LUNG: Good air entry bilateral with no  rales, rhonchi, wheezing, or rubs  HEART: Regular rate and rhythm; No murmurs, rubs, or gallops  ABDOMEN: Soft, Nontender, Nondistended; Bowel sounds present  EXTREMITIES:  2+ Peripheral Pulses, No clubbing, cyanosis, or edema    Care Discussed with Consultants/Other Providers [ x] YES  [ ] NO

## 2019-11-22 NOTE — DIETITIAN INITIAL EVALUATION ADULT. - OTHER INFO
Pt with Dementia, CAD on plavix, HTN, HLD, afib, BPH brought in by daughter s/p fall admitted for behavioral disturbance and assistance with placement.    Pt remains LOS Day 10. Pt with Dementia, therefore, attempted to reach Family members- Son Jonathan Crowe at [phone] and Daughter Melba Crowe at [phone] without success, no answer. Pt was eating after tray set up at time of visit, reports to good appetite and meal intakes. Same was confirmed by PCA on unit. No reported chewing, swallowing difficulties or any nausea, vomiting, diarrhea, constipation. HIE reviewed and noted weight (6/19)-66 kg and current admit weight 61.7 kg, reflective of 4.3 kg weight decline in 5 months. Pt also noted with severely sunken in temporals.

## 2019-11-22 NOTE — PROGRESS NOTE ADULT - SUBJECTIVE AND OBJECTIVE BOX
Cardiovascular Disease Progress Note    Overnight events: No acute events overnight. Mr. Crowe denies chest pain or SOB.   Otherwise review of systems negative    Objective Findings:  T(C): 36.9 (11-22-19 @ 05:40), Max: 37.1 (11-21-19 @ 21:16)  HR: 93 (11-22-19 @ 05:40) (76 - 93)  BP: 129/76 (11-22-19 @ 05:40) (129/76 - 154/83)  RR: 18 (11-22-19 @ 05:40) (18 - 18)  SpO2: 96% (11-22-19 @ 05:40) (95% - 100%)  Wt(kg): --  Daily     Daily       Physical Exam:  Gen: NAD; Patient resting comfortably  HEENT: EOMI, Normocephalic/ atraumatic  CV: RRR, normal S1 + S2, no m/r/g  Lungs:  Normal respiratory effort; clear to auscultation bilaterally  Abd: soft, non-tender; bowel sounds present  Ext: No edema; warm and well perfused    Telemetry: n/a    Laboratory Data:                        9.1    15.97 )-----------( 250      ( 22 Nov 2019 05:30 )             29.1     11-22    141  |  105  |  34<H>  ----------------------------<  89  4.2   |  25  |  1.13    Ca    9.2      22 Nov 2019 05:30  Phos  2.8     11-22  Mg     2.1     11-22                Inpatient Medications:  MEDICATIONS  (STANDING):  atorvastatin 10 milliGRAM(s) Oral at bedtime  clopidogrel Tablet 75 milliGRAM(s) Oral daily  diltiazem    milliGRAM(s) Oral daily  donepezil 5 milliGRAM(s) Oral at bedtime  enoxaparin Injectable 40 milliGRAM(s) SubCutaneous daily  melatonin 3 milliGRAM(s) Oral <User Schedule>  multivitamin 1 Tablet(s) Oral daily  risperiDONE   Tablet 0.25 milliGRAM(s) Oral two times a day  tamsulosin 0.4 milliGRAM(s) Oral at bedtime  thiamine 100 milliGRAM(s) Oral daily      Assessment: 87 year old man with dementia, reported CAD on plavix, HTN, HLD, and a-fib not on AC presents with mechanical fall and aggression.    Plan of Care:    #Post-operative evaluation-  Mr. Crowe tolerated L inguinal surgery well on 11/18.  He displays no signs or symptoms of post-op coronary ischemia or decompensated CHF.   Echocardiogram from 7/30/2019 reviewed- normal LV systolic function with no significant valvular disease.  Continue current cardiac management.     #A-fib-  Rate controlled on cardizem.  Would hold off on AC given frequent falls.     #CAD-  EKG is a-fib without ischemic changes.  Recent echo with preserved LVEF.  Continue with statin and Plavix.     #HTN-  BP acceptable on current regimen.       Over 25 minutes spent on total encounter; more than 50% of the visit was spent counseling and/or coordinating care by the attending physician.      Ronald Williamson MD [facility]  Cardiovascular Disease  [phone]

## 2019-11-22 NOTE — CONSULT NOTE ADULT - ASSESSMENT
89 year old with dementia presented with increased agitation s/p elective repair of left inguinal hernia. Now with leukocytosis.      1) Change in MS at presention  Initial CXR was clear.   UA without significant pyuria.    Pt returned to baseline without intervention    2) Leukocytosis;  s/p surgery on 11/18    WBC increased to 16 on 11/21  15 K today    He has been afebrile.  A repeat UA was normal    A repeat chest x was without consolidation 89 year old with dementia presented with increased agitation s/p elective repair of left inguinal hernia. Now with leukocytosis.      1) Change in MS at presention  Initial CXR was clear.   UA without significant pyuria.    Pt returned to baseline without intervention    2) Leukocytosis;  s/p surgery on 11/18    WBC increased to 16 on 11/21  15 K today    He has been afebrile.  A repeat UA was normal    A repeat chest x was without consolidation    Repeat WBC with pmd next week    Observe off antimicrobials

## 2019-11-22 NOTE — DIETITIAN INITIAL EVALUATION ADULT. - PROBLEM SELECTOR PLAN 3
-likely 2/2 to delirium vs progression of dementia. CT head without acute pathology  -no identifiable infectious or metabolic cause. UA neg, CXR neg  -Seroquel 12.5 mg PO q6h as needed as per behavioral health recs last admission. IV haldol 0.5 mg q6 prn if unable to tolerate PO'  -CM eval in AM re need for placement

## 2019-11-22 NOTE — DISCHARGE NOTE NURSING/CASE MANAGEMENT/SOCIAL WORK - NSDCFUADDAPPT_GEN_ALL_CORE_FT
Please follow up with Dr. Nuñez (surgeon) in the office within one week  Please follow up with your PCP in 1 week.  If you are in need of assistance with medication adjustment you can follow-up with your outpatient provider or refer to the [facility] clinic by calling [phone].

## 2019-11-26 LAB
BACTERIA BLD CULT: SIGNIFICANT CHANGE UP
BACTERIA BLD CULT: SIGNIFICANT CHANGE UP

## 2020-07-08 ENCOUNTER — EMERGENCY (EMERGENCY)
Facility: HOSPITAL | Age: 85
LOS: 1 days | Discharge: HOME | End: 2020-07-08
Attending: STUDENT IN AN ORGANIZED HEALTH CARE EDUCATION/TRAINING PROGRAM | Admitting: STUDENT IN AN ORGANIZED HEALTH CARE EDUCATION/TRAINING PROGRAM
Payer: MEDICARE

## 2020-07-08 VITALS
SYSTOLIC BLOOD PRESSURE: 135 MMHG | OXYGEN SATURATION: 99 % | RESPIRATION RATE: 18 BRPM | TEMPERATURE: 98 F | HEART RATE: 76 BPM | DIASTOLIC BLOOD PRESSURE: 80 MMHG

## 2020-07-08 LAB
ALBUMIN SERPL ELPH-MCNC: 3.7 G/DL — SIGNIFICANT CHANGE UP (ref 3.3–5)
ALP SERPL-CCNC: 59 U/L — SIGNIFICANT CHANGE UP (ref 40–120)
ALT FLD-CCNC: 16 U/L — SIGNIFICANT CHANGE UP (ref 4–41)
ANION GAP SERPL CALC-SCNC: 10 MMO/L — SIGNIFICANT CHANGE UP (ref 7–14)
APPEARANCE UR: CLEAR — SIGNIFICANT CHANGE UP
AST SERPL-CCNC: 18 U/L — SIGNIFICANT CHANGE UP (ref 4–40)
BACTERIA # UR AUTO: NEGATIVE — SIGNIFICANT CHANGE UP
BASOPHILS # BLD AUTO: 0.04 K/UL — SIGNIFICANT CHANGE UP (ref 0–0.2)
BASOPHILS NFR BLD AUTO: 0.7 % — SIGNIFICANT CHANGE UP (ref 0–2)
BILIRUB SERPL-MCNC: 0.4 MG/DL — SIGNIFICANT CHANGE UP (ref 0.2–1.2)
BILIRUB UR-MCNC: NEGATIVE — SIGNIFICANT CHANGE UP
BLOOD UR QL VISUAL: NEGATIVE — SIGNIFICANT CHANGE UP
BUN SERPL-MCNC: 22 MG/DL — SIGNIFICANT CHANGE UP (ref 7–23)
CALCIUM SERPL-MCNC: 9.7 MG/DL — SIGNIFICANT CHANGE UP (ref 8.4–10.5)
CHLORIDE SERPL-SCNC: 104 MMOL/L — SIGNIFICANT CHANGE UP (ref 98–107)
CO2 SERPL-SCNC: 27 MMOL/L — SIGNIFICANT CHANGE UP (ref 22–31)
COLOR SPEC: YELLOW — SIGNIFICANT CHANGE UP
CREAT SERPL-MCNC: 1.19 MG/DL — SIGNIFICANT CHANGE UP (ref 0.5–1.3)
EOSINOPHIL # BLD AUTO: 0.13 K/UL — SIGNIFICANT CHANGE UP (ref 0–0.5)
EOSINOPHIL NFR BLD AUTO: 2.1 % — SIGNIFICANT CHANGE UP (ref 0–6)
GLUCOSE SERPL-MCNC: 97 MG/DL — SIGNIFICANT CHANGE UP (ref 70–99)
GLUCOSE UR-MCNC: NEGATIVE — SIGNIFICANT CHANGE UP
HCT VFR BLD CALC: 34 % — LOW (ref 39–50)
HGB BLD-MCNC: 10.4 G/DL — LOW (ref 13–17)
HYALINE CASTS # UR AUTO: NEGATIVE — SIGNIFICANT CHANGE UP
IMM GRANULOCYTES NFR BLD AUTO: 0.2 % — SIGNIFICANT CHANGE UP (ref 0–1.5)
KETONES UR-MCNC: NEGATIVE — SIGNIFICANT CHANGE UP
LEUKOCYTE ESTERASE UR-ACNC: NEGATIVE — SIGNIFICANT CHANGE UP
LYMPHOCYTES # BLD AUTO: 0.91 K/UL — LOW (ref 1–3.3)
LYMPHOCYTES # BLD AUTO: 15 % — SIGNIFICANT CHANGE UP (ref 13–44)
MCHC RBC-ENTMCNC: 27.8 PG — SIGNIFICANT CHANGE UP (ref 27–34)
MCHC RBC-ENTMCNC: 30.6 % — LOW (ref 32–36)
MCV RBC AUTO: 90.9 FL — SIGNIFICANT CHANGE UP (ref 80–100)
MONOCYTES # BLD AUTO: 0.7 K/UL — SIGNIFICANT CHANGE UP (ref 0–0.9)
MONOCYTES NFR BLD AUTO: 11.5 % — SIGNIFICANT CHANGE UP (ref 2–14)
NEUTROPHILS # BLD AUTO: 4.29 K/UL — SIGNIFICANT CHANGE UP (ref 1.8–7.4)
NEUTROPHILS NFR BLD AUTO: 70.5 % — SIGNIFICANT CHANGE UP (ref 43–77)
NITRITE UR-MCNC: NEGATIVE — SIGNIFICANT CHANGE UP
NRBC # FLD: 0 K/UL — SIGNIFICANT CHANGE UP (ref 0–0)
PH UR: 6 — SIGNIFICANT CHANGE UP (ref 5–8)
PLATELET # BLD AUTO: 164 K/UL — SIGNIFICANT CHANGE UP (ref 150–400)
PMV BLD: 11.1 FL — SIGNIFICANT CHANGE UP (ref 7–13)
POTASSIUM SERPL-MCNC: 4 MMOL/L — SIGNIFICANT CHANGE UP (ref 3.5–5.3)
POTASSIUM SERPL-SCNC: 4 MMOL/L — SIGNIFICANT CHANGE UP (ref 3.5–5.3)
PROT SERPL-MCNC: 6.7 G/DL — SIGNIFICANT CHANGE UP (ref 6–8.3)
PROT UR-MCNC: 20 — SIGNIFICANT CHANGE UP
RBC # BLD: 3.74 M/UL — LOW (ref 4.2–5.8)
RBC # FLD: 13.5 % — SIGNIFICANT CHANGE UP (ref 10.3–14.5)
RBC CASTS # UR COMP ASSIST: SIGNIFICANT CHANGE UP (ref 0–?)
SODIUM SERPL-SCNC: 141 MMOL/L — SIGNIFICANT CHANGE UP (ref 135–145)
SP GR SPEC: 1.03 — SIGNIFICANT CHANGE UP (ref 1–1.04)
SQUAMOUS # UR AUTO: SIGNIFICANT CHANGE UP
UROBILINOGEN FLD QL: SIGNIFICANT CHANGE UP
WBC # BLD: 6.08 K/UL — SIGNIFICANT CHANGE UP (ref 3.8–10.5)
WBC # FLD AUTO: 6.08 K/UL — SIGNIFICANT CHANGE UP (ref 3.8–10.5)
WBC UR QL: SIGNIFICANT CHANGE UP (ref 0–?)

## 2020-07-08 PROCEDURE — 99284 EMERGENCY DEPT VISIT MOD MDM: CPT | Mod: 25

## 2020-07-08 PROCEDURE — 93010 ELECTROCARDIOGRAM REPORT: CPT

## 2020-07-08 NOTE — ED ADULT NURSE NOTE - CHIEF COMPLAINT QUOTE
Pt from [facility] was sent in because he had a verbal argument with another resident, per EMS pt was calm and cooperative when they arrived. Pt is awake and alert x 1 .

## 2020-07-08 NOTE — ED PROVIDER NOTE - PMH
Atrial fibrillation    CAD (coronary artery disease)    Dementia    History of BPH    Hypertension, unspecified type    Other hyperlipidemia

## 2020-07-08 NOTE — ED ADULT NURSE NOTE - OBJECTIVE STATEMENT
A&OX1 PT sent from NH after getting into a verbal altercation with another resident. At time of assessment PT is confused yet cooperative, denies any pain or distress. PT is unsure of why he is in the hospital. Hourly rounding in place to ID needs of care an to ID needs of care.

## 2020-07-08 NOTE — ED ADULT NURSE NOTE - NS ED NURSE RECORD ANOTHER VITAL SIGN
Problem: Goal Outcome Summary  Goal: Goal Outcome Summary  Patient admitted 1/13 for HF exacerbation. History of amyloidosis s/p chemo. VSS, denies pain. 2L NC. SBA. Lasix infusing at 10 ml/hr with good UO. Lymphema wraps on LE for severe edema. Anticipate further diuresis and possible pleurvac placement/thoracentesis on Monday. Continue to assess and monitor, notify [facility] with concerns.          Yes

## 2020-07-08 NOTE — ED PROVIDER NOTE - OBJECTIVE STATEMENT
87yo M with hx of CAD, dementia, BPH, HTN, Afib presents today with agitation at NH. Reportedly had an argument with another NH resident. Pt does not recall event. Here AOx2-3, do not recall previous events today. Pt not agitated in ER

## 2020-07-08 NOTE — ED PROVIDER NOTE - ATTENDING CONTRIBUTION TO CARE
87 y/o M with PMH dementia, CAD on plavix, HTN, HLD, afib, BPH p/w agitation from [facility]. per hx pt was agitated at NH and in verbal argument. Pt well appearing in ed calm, aaox2. States he does not know why he  is here. Denies pain in abdomen , nausea, vomiting, chest pain, syncope. Per paper work pt was agitated at NH and had verbal argument at the NH.   ROS: limited by dementia and pt stating no symptoms.   Gen: Awake, Alert, WD, WN, NAD  Head:  NC/AT  Eyes:  PERRL, EOMI, Conjunctiva pink, lids normal, no scleral icterus  ENT: OP clear, no exudates, no erythema, uvula midline,  moist mucus membranes  Neck: supple, nontender, no meningismus, no JVD, trachea midline  Cardiac/CV:  S1 S2, RRR, no M/G/R  Respiratory/Pulm:  CTAB, good air movement, normal resp effort, no wheezes/stridor/retractions/rales/rhonchi  Gastrointestinal/Abdomen:  Soft, nontender, nondistended, +BS, no rebound/guarding  Back:  no CVAT, no MLT  Ext:  warm, well perfused, moving all extremities spontaneously, no peripheral edema, distal pulses intact  Neuro:  AAOx3, sensation intact, motor 5/5 x 4 extremities, normal gait, speech clear  MDM as above

## 2020-07-08 NOTE — ED PROVIDER NOTE - PROGRESS NOTE DETAILS
Pt here with reports of agitation, not agitated in the ED. no complaints. will r/o signs of infection, most likely dc back to NH if all WNL pt calm labs and ekg normal will speak with social work for d/c Julia Johnson PGY2: Pt with negative covid test, SW arranged for transport back to nursing home. nursing home requesting for psych eval. spoke with psych that reports that psych eval cannot be a condition for accepting the pt back to the nursing home. pt has been calm while in the ED, dementia at baseline. Nursing home able to arrange for psych eval at the nursing home and not needed to be done at this time in the ED. Will discharge. pickup scheduled for 3:45.

## 2020-07-08 NOTE — ED PROVIDER NOTE - NSFOLLOWUPINSTRUCTIONS_ED_ALL_ED_FT
you were seen in the ED today for agitation.   you had labs done that didn't show any evidence of infection.   follow up with primary care doctor. you were seen in the ED today for agitation.   you had labs done that didn't show any evidence of infection.   follow up with primary care doctor.  return to the emergency department if you have any new or worsening symptoms.

## 2020-07-08 NOTE — ED PROVIDER NOTE - PATIENT PORTAL LINK FT
You can access the FollowMyHealth Patient Portal offered by [facility] by registering at the following website: [email or web address]. By joining [employer]’s FollowMyHealth portal, you will also be able to view your health information using other applications (apps) compatible with our system.

## 2020-07-08 NOTE — ED ADULT TRIAGE NOTE - CHIEF COMPLAINT QUOTE
Pt from [facility] was sent in because he had a verbal argument with another resident, per EMS pt was calm and cooperative when they arrived. Pt is awake and alert x 1 .

## 2020-07-08 NOTE — ED PROVIDER NOTE - CLINICAL SUMMARY MEDICAL DECISION MAKING FREE TEXT BOX
87 y/o M with PMH dementia, CAD on plavix, HTN, HLD, afib, BPH p/w agitation from [facility]. per hx pt was agitated at NH and in verbal argument. Pt well appearing in ed calm, aaox2. denies pain sob, will obtain ekg, bmp, cbc, ua, social work eval d/c to NH

## 2020-07-09 VITALS
TEMPERATURE: 98 F | DIASTOLIC BLOOD PRESSURE: 87 MMHG | HEART RATE: 78 BPM | SYSTOLIC BLOOD PRESSURE: 147 MMHG | OXYGEN SATURATION: 99 % | RESPIRATION RATE: 16 BRPM

## 2020-07-09 LAB — SARS-COV-2 RNA SPEC QL NAA+PROBE: SIGNIFICANT CHANGE UP

## 2020-07-09 RX ORDER — HALOPERIDOL DECANOATE 100 MG/ML
2.5 INJECTION INTRAMUSCULAR ONCE
Refills: 0 | Status: COMPLETED | OUTPATIENT
Start: 2020-07-09 | End: 2020-07-09

## 2020-07-09 NOTE — ED ADULT NURSE REASSESSMENT NOTE - NS ED NURSE REASSESS COMMENT FT1
Patient has been medically cleared to go back to his nursing home. Pending COVID results and transportation set up. Patient appears comfortable at this time.

## 2020-07-09 NOTE — ED ADULT NURSE REASSESSMENT NOTE - NS ED NURSE REASSESS COMMENT FT1
patient provided warm blankets and pillows, awaiting COVID results.  Skin breakdown/blistering noted around patient's rectum. Patient insisted on getting dressed, currently sleeping. Will continue to monitor.

## 2020-07-09 NOTE — PROVIDER CONTACT NOTE (OTHER) - BACKGROUND
Patient is a 88 year old, male, from [facility], sent in for agitated behavior.
Pt from nursing home; transport back to facility needed.

## 2020-07-09 NOTE — ED ADULT NURSE REASSESSMENT NOTE - NS ED NURSE REASSESS COMMENT FT1
Report received from REBECCA Lowry. Pt is A&Ox1, oriented to self. Pt became aggressive during RN exam & care. Attempted to obtain VS, pt ripped BP cuff off his arm, threw it across the room, and used explicit language towards RN. Unable to assess pt's skin due to pt's aggressive behavior. Dr. Johnson made aware of current situation. Respirations appear even & unlabored. Awaiting COVID results.

## 2020-07-09 NOTE — PROVIDER CONTACT NOTE (OTHER) - ASSESSMENT
Per provider Elizabeth COLLINS, patient is cleared and able to return to [facility]. LISY spoke with ANITRA Naylor of Nursing ([phone]) at [facility] and confirmed patients arrival. LISY spoke with Genaro, scheduled ambulance through [facility] ([phone]), for 3:45pm  under trip#519A to [facility], [address] [place] [address], P: [phone]. Verbal huddle regarding coordination of care completed with interdisciplinary team.
SW contacted nursing home at [phone]; as per RN Supervisor YAMILEX Quinteros swab and results are needed prior to pt returning to facility.  Provider informed.

## 2020-07-09 NOTE — ED ADULT NURSE REASSESSMENT NOTE - NS ED NURSE REASSESS COMMENT FT1
Pt is sitting upright eating lunch, appears comfortable. Respirations are even & unlabored. Pt is confused at this time but is able to be redirected, continues to be A&Ox1.

## 2020-07-09 NOTE — ED ADULT NURSE REASSESSMENT NOTE - NS ED NURSE REASSESS COMMENT FT1
PCA and RN at bedside to change patients linen and diaper but patient became aggressive and yelling at staff. MD aware.

## 2020-07-09 NOTE — ED ADULT NURSE REASSESSMENT NOTE - NS ED NURSE REASSESS COMMENT FT1
Pt was cleaned of urine; new bed sheets, blankets, and gown provided. Pt also given warm blankets, set up in fowlers position. Provided with a sandwich and ginger ale. Pt is calm at this time, is also A&Ox1.

## 2020-07-10 LAB
CULTURE RESULTS: SIGNIFICANT CHANGE UP
SPECIMEN SOURCE: SIGNIFICANT CHANGE UP

## 2020-07-13 PROBLEM — I25.10 ATHEROSCLEROTIC HEART DISEASE OF NATIVE CORONARY ARTERY WITHOUT ANGINA PECTORIS: Chronic | Status: ACTIVE | Noted: 2019-11-12

## 2020-07-13 PROBLEM — Z87.438 PERSONAL HISTORY OF OTHER DISEASES OF MALE GENITAL ORGANS: Chronic | Status: ACTIVE | Noted: 2019-11-12

## 2020-07-13 PROBLEM — F03.90 UNSPECIFIED DEMENTIA WITHOUT BEHAVIORAL DISTURBANCE: Chronic | Status: ACTIVE | Noted: 2019-11-12

## 2020-07-15 NOTE — H&P ADULT - NSHPREVIEWOFSYSTEMS_GEN_ALL_CORE
Opened in error
REVIEW OF SYSTEMS:    CONSTITUTIONAL: No weakness, fevers or chills  EYES/ENT: No visual changes;  no throat pain   NECK: No pain or stiffness  RESPIRATORY: No cough, wheezing, hemoptysis; No shortness of breath  CARDIOVASCULAR: No chest pain or palpitations  GASTROINTESTINAL: No abdominal or epigastric pain. No nausea, vomiting, or hematemesis; No diarrhea or constipation. No melena or hematochezia.  GENITOURINARY: No dysuria, change in frequency or hematuria  NEUROLOGICAL: No numbness or weakness  SKIN: No itching, burning, rashes, or lesions   Psych: No depression   All other review of systems is negative unless indicated above.

## 2020-09-04 NOTE — BEHAVIORAL HEALTH ASSESSMENT NOTE - NSBHPTASSESSDT_PSY_A_CORE
sit-to-stand, lift, walker, cane, etc )  - [place] fall precautions as indicated by assessment  - Record patient progress and toleration of activity level on Mobility SBAR; progress patient to next Phase/Stage  - Instruct patient to call for assistance with activity based on assessment  - Consider rehabilitation consult to assist with strengthening/weightbearing, etc   Outcome: Progressing     Problem: DISCHARGE PLANNING  Goal: Discharge to home or other facility with appropriate resources  Description: INTERVENTIONS:  - Identify barriers to discharge w/patient and caregiver  - Arrange for needed discharge resources and transportation as appropriate  - Identify discharge learning needs (meds, wound care, etc )  - Arrange for interpretive services to assist at discharge as needed  - Refer to Case Management Department for coordinating discharge planning if the patient needs post-hospital services based on physician/advanced practitioner order or complex needs related to functional status, cognitive ability, or social support system  Outcome: Progressing     Problem: Knowledge Deficit  Goal: Patient/family/caregiver demonstrates understanding of disease process, treatment plan, medications, and discharge instructions  Description: Complete learning assessment and assess knowledge base    Interventions:  - Provide teaching at level of understanding  - Provide teaching via preferred learning methods  Outcome: Progressing     Problem: Potential for Falls  Goal: Patient will remain free of falls  Description: INTERVENTIONS:  - Assess patient frequently for physical needs  -  Identify cognitive and physical deficits and behaviors that affect risk of falls    -  [place] fall precautions as indicated by assessment   - Educate patient/family on patient safety including physical limitations  - Instruct patient to call for assistance with activity based on assessment  - Modify environment to reduce risk of injury  - Consider OT/PT consult to assist with strengthening/mobility  Outcome: Progressing     Problem: Prexisting or High Potential for Compromised Skin Integrity  Goal: Skin integrity is maintained or improved  Description: INTERVENTIONS:  - Identify patients at risk for skin breakdown  - Assess and monitor skin integrity  - Assess and monitor nutrition and hydration status  - Monitor labs   - Assess for incontinence   - Turn and reposition patient  - Assist with mobility/ambulation  - Relieve pressure over bony prominences  - Avoid friction and shearing  - Provide appropriate hygiene as needed including keeping skin clean and dry  - Evaluate need for skin moisturizer/barrier cream  - Collaborate with interdisciplinary team   - Patient/family teaching  - Consider wound care consult   Outcome: Progressing

## 2020-11-29 NOTE — PHYSICAL THERAPY INITIAL EVALUATION ADULT - ACTIVE RANGE OF MOTION EXAMINATION, REHAB EVAL
[de-identified] : Scoliosis right T11-L4 Measures 28°,unchanged from previous imaging Risser 5 no Active ROM deficits were identified

## 2021-02-12 NOTE — ED ADULT TRIAGE NOTE - BANDS:
well developed, well nourished , in no acute distress , ambulating without difficulty , normal communication ability Fall Risk;

## 2022-06-15 NOTE — DISCHARGE NOTE NURSING/CASE MANAGEMENT/SOCIAL WORK - NSDPACMPNY_GEN_ALL_CORE
[FreeTextEntry1] : 1. AR/AC - Loratadine 10mg - Strongly positive to DM, Mild positive to Cat , Dog, Tree, Weed and mold \par \par 2. AS - Symbicort 160/4.5, Montelukast 10mg, albuterol prn per pulm\par \par 3. FA - Avoid shellfish - Epipen can try 3 step trial to fish
Family

## 2022-11-07 NOTE — DIETITIAN INITIAL EVALUATION ADULT. - +GENDER
INTRAOPERATIVE NEUROPHYSIOLOGIC MONITORING (IONM) REPORT     [facility] [address], [address] (Clinical Neurophysiology)  [place], [place] [address]  Ph: [phone]  F: [phone]    Patient Name:Placido Walters  YOB: 1975  MRN:[id number]    Date of the Procedure/Surgery:11/7/2022  Diagnosis:CERVICAL MYELOPATHY  Procedure:    C5-6 & C6-7 ANTERIOR CERVICAL DISCECTOMY FUSION WITH INSTRUMENTATION AND BONE GRAFTING  CPT(R) Code:  94671 - ARTHRODESIS ANTERIOR ANTERBODY CERVICAL BELOW C2      Surgeon:Guero Pinto MD  Surgeon(s) and Role:     * Guero Pinto MD - Primary     * Jairo Rodriguez MD - Assisting  Anesthesiologist: Henry Riojas MD    IONM Technician:ADAN Riley  IONM Interpreting Physician/ Neurologist:Yuly Hoover MD    Monitoring Start Time:8:10  Monitoring End Time:9:39    Professional interpretation  History:(obtained from chart review) Placido Walters is a 47 year old male with a 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    Height of the patient:  Ht Readings from Last 1 Encounters:   11/07/22 6' 0.05\" (1.83 m)      Weight of the patient:  Wt Readings from Last 1 Encounters:   11/07/22 110 kg (242 lb 8.1 oz)       Neuroimaging:   LAST MRI:  === 06/03/22 ===    MRI CERVICAL SPINE WO CONTRAST    - Narrative -  EXAM: MRI CERVICAL SPINE WO CONTRAST    CLINICAL INDICATION: Congenital stenosis    COMPARISON:  12/10/2021    TECHNIQUE: Sagittal T1, T2, inversion recovery and diffusion-weighted  images of the cervical spine were acquired with T1 and T2-weighted axial  images.    FINDINGS: The alignment is normal.  The craniovertebral junction and  cervical  medullary junction are normal.    C2-C3: The disc, foramen and canal are normal.    C3-C4: Moderate bony foraminal stenosis is present on the right and caused  by hypertrophy of right uncovertebral joint.  The left foramen and central  canal are widely patent.    C4-C5: There is mild left foraminal stenosis caused by hypertrophy of left  uncovertebral joint.  The right foramen and central canal are widely  patent.    C5-C6: There is moderate central canal stenosis caused by congenitally  small bony canal and posterior bulge of intervertebral disc.  There is mild  bilateral bony foraminal stenosis.    C6-C7: There is a large, soft, posterolateral protruded herniated disc on  the right, stable and unchanged.  There is compression of the ventral  lateral dural sac and spinal cord on the right side.  There is severe  central canal stenosis.  Minimal edema is seen within the spinal cord at  this site.  There is severe right foraminal stenosis, and moderate left  foraminal stenosis.      C7-T1: The disc, foramen and canal are normal.    - Impression -  Large, soft, posterolateral protruded herniated disc on the right at C6-C7  level with mild extrinsic compression upon the cervical spinal cord.  There  is mild edema within the spinal cord at this site.    No interval change.    Electronically Signed by: DC GOODEN M.D.  Signed on: 6/3/2022 8:27 AM    ___________________________________________________________________________    MRI LUMBAR SPINE WO CONTRAST    - Narrative -  EXAM: MRI LUMBAR SPINE WO CONTRAST    CLINICAL INDICATION: Congenital stenosis, low back pain    COMPARISON:  12/10/2021    TECHNIQUE: Sagittal T1, T2, inversion recovery and diffusion-weighted  images of the lumbar spine were acquired with T1 and T2-weighted axial  images.    FINDINGS: There are no vertebral body compression fractures.  The alignment  is normal.  The conus medullaris is normal.    L1-L2: The disc, foramen and canal are  normal.    L2-L3: There is congenital central canal stenosis along with posterior  epidural lipomatosis.  There is a severely narrowed dural sac.  There is  mild bilateral foraminal stenosis caused by facet arthropathy.    L3-L4: There is mild posterior bulge of intervertebral disc with a torn  posterior central disc annulus.  There is severe central canal stenosis  caused by congenitally small bony canal along with posterior epidural  lipomatosis.  There is mild bilateral foraminal stenosis.    L4-L5: There is a broad-based posterolateral protruded herniated disc on  the right side.  In addition, there is diffuse posterior bulge of  intervertebral disc with broad-based osteophyte and bilateral lateral  osteophytes.  There is moderate central canal stenosis, moderate right  foraminal stenosis and severe left foraminal stenosis.    L5-S1: There is a rudimentary intervertebral disc.  The bony canal and bony  foramen are widely patent.    The sacrum is intact.    - Impression -  1.  Significant central canal stenosis at L2-L3, L3-L4 and L4-L5 levels  caused by congenitally small bony canal posterior bulging discs and  posterior epidural lipomatosis.  These findings are stable and unchanged.  2.  At L4-L5 level there is associated moderate central canal stenosis,  moderate right foraminal stenosis and severe left foraminal stenosis.  3.  Overall, no interval change.    Electronically Signed by: DC GOODEN M.D.  Signed on: 6/3/2022 8:37 AM      === 12/10/21 ===    MRI LUMBAR SPINE WO CONTRAST    - Narrative -  EXAM/TECHNIQUE: MRI LUMBAR SPINE WO CONTRAST.    CLINICAL INDICATION: Radiculopathy and numbness.    COMPARISON: 08/08/2017.    FINDINGS: The transitional lumbosacral vertebra is considered L5 as in the  previous studies.    T12-L1 and L1-L2 levels are normal.  Visualized lower spinal cord is  normal.    Spinal canal is congenitally slender from L2 to L4 inclusive.    At L2-L3 the disc is normal.  Spinal canal  is severely congenitally  narrowed with bulky posterior elements causing severe central spinal  stenosis.  This is contiguous through the L3-L4 level where there is slight  bulging of the disc also.    At L4-L5 there is moderate degenerative disc disease.  Right disc extrusion  is noted again.  It is shallower than previously but more broad-based.  Still probably affecting the right L5 nerve.  There is only mild central  spinal stenosis.    L5-S1 disc is transitional.    There is slight chronic depression of superior endplate of T12.  Other  vertebrae have normal morphology and signal.    - Impression -  1.  Severe congenital spinal stenosis from L2-L3 through L3-L4.  2.  L4-L5 right-sided disc extrusion is shallower but wider base.  There is  still compressing the right L5 nerve.    Electronically Signed by: GABY AMARO M.D.  Signed on: 12/10/2021 7:44 PM    Technical Summary and Parameters:   Modalities:  Intraoperative somatosensory evoked potentials (SSEP-upper and lower extremities)  Intraoperative transcranial motor evoked potentials (TCMEP-upper and lower extremities)  Intraoperative Free Run electromyography (Fr EMG).    Stimulation conditions:  (SSEP-PTN/LE) Bilateral posterior tibial nerves were individually stimulated at the ankles.  (SSEP-UN/UE) Bilateral ulnar nerves were individually stimulated at the wrist.  (TCMEP) Bilateral motor cortices were stimulated with electrodes placed over the scalp.    Recording Parameters:  SSEP upper extremities: Electrodes are placed at the the bilateral erbs point,upper cervical spine, bilateral scalp somatosensory cortex areas.    SSEP lower extremities: Electrodes are placed at the level of the bilateral popliteal fossa, upper cervical spine, bilateral scalp somatosensory cortex areas.    TCMEP upper extremities: Electrodes are placed at bilateral BILATERAL-Triceps (Tri ), BILATERAL-Deltoid (D), BILATERAL-Biceps (Bi) and UTCWZESYH-Rknidj-Ogsetrqcfx (T-H)  muscles.  TC MEP of the lower extremities: Electrodes are placed at bilateral BILATERAL-Abductor Hallucis (AH) and BILATERALTibialis Anterior(TA) muscles.  EMG: Needle electrodes are placed at the bilateral Abductor Pollicis Brevis (APB), Abductor Digiti Minimi (ADM), Triceps (T), Biceps (B) and Deltoid (D) muscles.      Anesthesia:  General  Inhalational + supplemental IV    Other Technical:  Train of 4 during critical times-4/4 with ulnar nerve stimulation and recording from Thenar-Hypothenar muscles to assess neuromuscular blockade  TcMEP screening- There is no history of pacemaker, DBS/IC devie, cochlear implant and skull defect  Bite Block- Bilateral    Description:  (SSEP) Somatosensory pathways are monitored by means of bilateral posterior tibial and ulnar evoked potentials.  The data was obtained from an alternating left and right stimulation pattern.  Sensory responses were obtained from the peripheral nerve, brainstem and somatosensory cortex. Supramaximal electrical stimulation was delivered to each stimulus site at to optimize waveform morphology and signal-to-noise ration. Recording electrodes consisted of subdermal needle electrodes which were positioned to obtain the following channels: CP3-Cp4/, CP4-CP3, CP3 -Fz/Cp4-Fz, C5-Fz, CZ'-Fz, Bilateral Erb's Point & Popliteal Fossa.  (MEP)The functional integrity of the motor pathway was monitored throughout the surgery by means of bilateral transcranial motor evoked potentials.Compound muscle action potentials were recorded from bilateral upper and lower extremity muscles.  (EMG)The physiologic status of cervical roots was monitored by spontaneous electromyography from bilateral Abductor Pollicis Brevis (APB), Abductor Digiti Minimi (ADM), Triceps (T), Biceps (B) and Deltoid (D) muscles.    Baselines:   Post induction baseline BILATERAL posterior tibial (SSEP) somatosensory cortical and subcortical evoked responses/potentials are moderately well defined,  reproducible, with prolonged but symmetrical latencies for the stated height and symmetrical amplitudes. BILATERAL Popliteal fossa responses are contaminated with artifacts  Post induction baseline BILATERAL ulnar (SSEP) somatosensory cortical and subcortical evoked responses/potentials are moderately well defined, reproducible, with symmetrical latencies and symmetrical amplitudes.BILATERAL. Erb's oint responses are contaminated with artifacts  Post induction baseline transcranial motor evoked potentials (TCMEP) displayed compound muscle action potentials with  excellent morphology and repeatability from    -TQORPDYHB-Ezjjhe-Lipamkddpv (T-H)  marginal responses from  -BILATERAL-Abductor Hallucis (AH) and BILATERALTibialis Anterior(TA)  absent responses from   - BILATERAL-Triceps (Tri ), BILATERAL-Deltoid (D) and BILATERAL-Biceps (Bi),    No spontaneous/abnormal electromyographic activity was seen from bilateral Abductor Pollicis Brevis (APB), Abductor Digiti Minimi (ADM), Triceps (T), Biceps (B) and Deltoid (D) muscles at baseline.    Monitoring:   During the course of surgery the neurogenic potentials were continuously obtained, averaged, displayed on the monitoring station and continuously reviewed in real time.The responses were compared to the previously acquired post induction baselines. The signals varied in accordance with the usual anesthetic, hemodynamic and thermal changes that occurred throughout the procedure  No clinically significant changes were seen in either latency or amplitude of the bilateral SSEP responses for the entire length of the surgical procedure.  There was no amplitude decrease greater than 50% or latency increase more than 10% during the procedure.  No significant positional changes were in the peripheral responses of tibial and ulnar SSEPs.  No significant changes in the compound muscle action potentials elicited by transcranial motor stimulation (TCMEP) was seen.    Electromyography  was uneventful with no neurotonic discharges from bilateral Abductor Pollicis Brevis (APB), Abductor Digiti Minimi (ADM), Triceps (T), Biceps (B) and Deltoid (D) muscles.    Summary and Impression:   This continuous intraoperative neurophysiologic monitoring is uneventful.   The baseline somatosensory evoked responses from lower extremities indicate delayed peripheral and central conduction in the somatosensory pathways.  The baseline somatosensory evoked responses from upper extremities indicate normal peripheral and central conduction in the somatosensory pathways.  The baseline motor evoked  responses from lower extremities indicate partially blocked conduction in the central and peripheral motor pathways.  The baseline motor evoked responses from upper  extremities indicate partially blocked conduction in the central and peripheral motor pathways.  The baseline spontaneous and free run electromyography was normal confirming integrity of the nerve roots and peripheral motor nerves.  This intra operative continuous somatosensory(SSEP), motor (MEP) evoked potential and electromyographic (EMG) monitoring did not demonstrate any significant changes related to the operative procedure.    The test is performed by IONM Technologist (ADAN)in the operating room and continuously supervised by Neurophysiology Physician (MD) outside the operating room in real time. There is a continuous real time digital communication between the physician and technologist and direct feed back was given to the surgeon during the surgery.    I HAVE PERFORMED CONTINUOUS INTRAOPERATIVE NEUROPHYSIOLOGIC MONITORING FROM OUTSIDE OPERATING ROOM, MONITORED THE CASE IN REAL TIME OVER A HIPPA COMPLIANT NETWORK FROM 8:10 TO 9:39 ON 11/7/2022      Melissa  ----------------------------------------  Yuly Hoover MD      Event Log & Screen Shots        Time Event Priority   07:27:07 Discussion with surgeon Dr. Pinto requested SSEP, EMG and TcMEP  monitoring.  All other modalities offered and declined.  Low   07:33:31 Discussion with anesthesiologist Dr. Riojas acknowelged requested anesthetic Low   07:33:36 Patient in room Low   07:34:08 EMG recording from bilateral det. bic, tric, hands.  TOF from left UN to left hand Low   08:01:19 Impedance (1.1 kÙ - 118.8 kÙ) Low   08:01:41 Bite Block Warning Confirmed Low   08:01:41 TcMep 400 v response from bilateral hands and feet Low   08:01:41 sevo 2 mac Low   08:09:54 Impedance (1.0 kÙ - 2.3 kÙ) Low   08:10:06 Baseline Set - R MEP Low   08:10:11 Baseline Set - L MEP Low   08:10:29 TOF 4/4 Low   08:13:15 /76 (89), sevo 1.09/1.26, prop 100 mcg/kg/min, BT 35.5 Low   08:14:12 Screenshot Low   08:17:57 Baseline Set - R UN Low   08:17:58 Baseline Set - L UN Low   08:17:58 Baseline Set - L PTN Low   08:18:01 Baseline Set - R PTN Low   08:18:28 Screenshot Low   08:19:32 Baseline UN 30 mA, 1.41 Hz, 0.2 ms, PTN 55 mA, 500 PW, 141 Hz, 0.3 ms Low   08:19:34 incision Low   08:23:43 asked anesthesthesiologist Dr. Riojas to lower gas further, was told it's coming down Low   08:25:03 sevo 0.85/0.95 Low   08:39:37 /72 (84), sevo 0.68/0.85, prop 100 mcg/kg/min, BT 35.3 Low   08:40:16 TOF 4/4 Low   08:43:28 decompressing Low   08:45:10 scope in Low   09:00:14 surgeon informed and acknowleged emg from right hand Low   09:02:02 sizing placing cage Low   09:02:06 scope out Low   09:03:05 /75(88), sevo 0.60/0.67, prop 100 mcg/kg/min, BT 35.3 Low   09:11:04 Impedance (0.8 kÙ - 1.9 kÙ) Low   09:11:12 scope in Low   09:12:12 decompressing Low   09:15:24 surgeon informed and acknowledged emg burst from left side Low   09:17:01 surgeon infomred and acknowelged emg from right hand Low   09:17:33 TOF 4/4 Low   09:19:58 surgeon informed and acknowelged emg from left Tric Low   09:21:04 scope out Low   09:21:37 sizing placing cage Low   09:30:24 Scope out Low   09:30:52 NBP 95/51 (62), sevo 0.58/0.62, prop 100mcg/kg/min, BT 35.7  Low   09:32:12 Irrigation Low   09:33:21 placing drain Low   09:34:58 Closing TcMEP Low   09:35:36 closing Low   09:36:16 TcMEP 400 V response from bilateral hands, legs, feet.  surgeon informed and acknowleged Low   09:38:13 Closing SSEP and EMG Low   09:39:44 monitoring end Low   Chat log  Time Author Message   07:32:48 Yuly Hoover- GM   07:33:41 Herminia Brumfield good morning   08:02:33 Herminia Brumfield gas is on, so hes coming down   08:02:57 Yuly Hoover Thanks Oumou   08:03:08 Yuly Hoover Mep from only hands and feet   08:17:26 Yuly Hoover cord compression at C6/C7- mild withswelling   08:17:44 Yuly Hoover ptns small and prolonged, correlates with myeloapthy   08:17:54 Herminia Brumfield yes   08:18:45 Yuly Hoover not sure how much we will get in the LE, but uppers hould improve after gas is gone, try best for TIVA   08:19:04 Yuly Hoover since patient has cord compression   08:19:10 Herminia Brumfield yes, Im trying definately needs tiva   08:24:48 Herminia Brumfield I tried to explain that that my signals are not great and asked to come down more on gas, he said he is   09:35:29 Herminia Brumfield ran final motor, still little gas on- i couldn't get tiva   09:35:41 Herminia Brumfield closing   09:36:06 Yuly Hoover we still have those baby responses from both feet, so taht is graet   09:36:34 Herminia Brumfield yes also little from legs    09:36:51 Yuly Hoover agree   09:38:23 Yuly Hoover SSEP at baseline at closing as well   09:38:37 Yuly Hoover you have 2 lumbar acses to follwo today   09:38:51 Herminia Brumfield yes   09:39:01 Herminia Brumfield we are done monitoring this one   09:39:08 Yuly Hoover Ty   Baseline TcMEP [11/7/2022 08:14:12]     Baseline SSEP and EMG [11/7/2022 08:18:28]    Closing TcMEP [11/7/2022 09:34:58]    Closing SSEP          (This note used Dragon technology. Transcription errors are not uncommon  and may not have been corrected prior to electronically signing the note. Should you find these errors, please consult the clinician for interpretation (or apply common sense adjustment when safe and appropriate)       Statement Selected

## 2023-08-28 NOTE — DIETITIAN INITIAL EVALUATION ADULT. - DIET TYPE
Monitor summary:        Rhythm: SR   Rate: 73-95  Ectopy: n/a  Measurements: 16./.09/.39        12hr chart check              Oral low sodium

## 2024-02-22 NOTE — ED ADULT NURSE NOTE - SUICIDE SCREENING DEPRESSION
No diet or activity restrictions.  No hematuria and flank pain is to be expected.  Call for fever or severe pain.  Follow up in 6 weeks with a renal ultrasound  
Negative

## 2025-05-27 NOTE — PROGRESS NOTE BEHAVIORAL HEALTH - HYGIENE
No insurance provider has been identified @ this time.  Please reach out to a PFA for a screening.  Following for [place] evals.    Fair